# Patient Record
Sex: FEMALE | Race: BLACK OR AFRICAN AMERICAN | NOT HISPANIC OR LATINO | URBAN - METROPOLITAN AREA
[De-identification: names, ages, dates, MRNs, and addresses within clinical notes are randomized per-mention and may not be internally consistent; named-entity substitution may affect disease eponyms.]

---

## 2021-09-04 ENCOUNTER — EMERGENCY (EMERGENCY)
Facility: HOSPITAL | Age: 86
LOS: 1 days | Discharge: HOME | End: 2021-09-04
Attending: EMERGENCY MEDICINE | Admitting: EMERGENCY MEDICINE
Payer: MEDICARE

## 2021-09-04 VITALS
SYSTOLIC BLOOD PRESSURE: 156 MMHG | RESPIRATION RATE: 18 BRPM | HEART RATE: 78 BPM | OXYGEN SATURATION: 99 % | DIASTOLIC BLOOD PRESSURE: 87 MMHG | TEMPERATURE: 98 F

## 2021-09-04 VITALS
SYSTOLIC BLOOD PRESSURE: 130 MMHG | RESPIRATION RATE: 16 BRPM | TEMPERATURE: 98 F | OXYGEN SATURATION: 100 % | HEART RATE: 77 BPM | DIASTOLIC BLOOD PRESSURE: 87 MMHG

## 2021-09-04 DIAGNOSIS — Z96.659 PRESENCE OF UNSPECIFIED ARTIFICIAL KNEE JOINT: Chronic | ICD-10-CM

## 2021-09-04 PROCEDURE — 73564 X-RAY EXAM KNEE 4 OR MORE: CPT | Mod: 26,RT

## 2021-09-04 PROCEDURE — 73110 X-RAY EXAM OF WRIST: CPT | Mod: 26,RT

## 2021-09-04 PROCEDURE — 99285 EMERGENCY DEPT VISIT HI MDM: CPT

## 2021-09-04 PROCEDURE — 70450 CT HEAD/BRAIN W/O DYE: CPT | Mod: 26

## 2021-09-04 RX ORDER — ACETAMINOPHEN 500 MG
650 TABLET ORAL ONCE
Refills: 0 | Status: COMPLETED | OUTPATIENT
Start: 2021-09-04 | End: 2021-09-04

## 2021-09-04 RX ADMIN — Medication 650 MILLIGRAM(S): at 15:09

## 2021-09-04 NOTE — ED ADULT TRIAGE NOTE - CHIEF COMPLAINT QUOTE
Pt AOX4 s/p witnessed fall at home, pt on Eliquis; struck right side of head on corner of furniture; no laceration noted; no LOC; pt c/o Right wrist and knee pain; Hx of HTN, AFib Pt AOX4 s/p witnessed fall at home, pt on Eliquis; struck right side of head on corner of furniture; no laceration noted; no LOC; pt c/o Right wrist and knee pain; Hx of HTN, AFib  Please keep grandson informed: Dillan

## 2021-09-04 NOTE — ED PROVIDER NOTE - PHYSICAL EXAMINATION
GENERAL: NAD, lying in bed comfortably  HEAD:  Atraumatic, normocephalic  EYES: EOMI, PERRLA, conjunctiva and sclera clear  ENT: Moist mucous membranes  NECK: Supple, no JVD  HEART: Regular rate and rhythm, no murmurs, rubs, or gallops  LUNGS: Unlabored respirations.  Clear to auscultation bilaterally, no crackles, wheezing, or rhonchi  ABDOMEN: Soft, nontender, nondistended, +BS  EXTREMITIES: 2+ peripheral pulses bilaterally. No clubbing, cyanosis, or edema. R patellar pain on palpation. Thenar eminence pain on palpation. No snuffbox tenderness  NERVOUS SYSTEM:  A&Ox3, CN II-XII wnl, 5/5 strength b/l UE and LE, sensation intact throughout. Romberg and finger-nose testing wnl  SKIN: No rashes or lesions

## 2021-09-04 NOTE — ED PROVIDER NOTE - PATIENT PORTAL LINK FT
You can access the FollowMyHealth Patient Portal offered by [facility] by registering at the following website: [email or web address]. By joining [employer]’s FollowMyHealth portal, you will also be able to view your health information using other applications (apps) compatible with our system.

## 2021-09-04 NOTE — ED PROVIDER NOTE - OBJECTIVE STATEMENT
86yo F PMHx HTN, A-fib (on Eliquis) who presents after a fall. She reports that she was walking down the stairs when she missed 3-4 steps and fell. She is unsure exactly which part of her body she hit, however thinks she hit behind her R ear, her R wrist, and R knee. She reports no LOC, no prodromal Sx including dizziness, nausea, feeling of warmth, palpitations, CP, SOB prior to the fall. Her son was home with her and heard her fall and called 911. Currently she is endorsing R thenar eminence pain and R patellar pain, but otherwise feels well.

## 2021-09-04 NOTE — ED ADULT NURSE NOTE - OBJECTIVE STATEMENT
Pt presents to rm 14, A&Ox4, ambulatory w/o assistance, pmhx of HTN and afib on eliquis, here for evaluation of witnessed mechanical fall- pt states "I was coming down the stairs and missed the step and hit the right side of my forehead, right wrist and right knee." Pt denies loss of consciousness. Denies chest pain, shortness of breath, palpitations, diaphoresis, headaches, fevers, dizziness, nausea, vomiting, diarrhea, or urinary symptoms at this time. No IV placed at this time as per MD orders, pt taken to XR, awaiting CT. Call bell in reach. Will reassess.

## 2021-09-04 NOTE — ED ADULT NURSE NOTE - CHIEF COMPLAINT QUOTE
Pt AOX4 s/p witnessed fall at home, pt on Eliquis; struck right side of head on corner of furniture; no laceration noted; no LOC; pt c/o Right wrist and knee pain; Hx of HTN, AFib  Please keep grandson informed: Dillan

## 2021-09-04 NOTE — ED PROVIDER NOTE - CLINICAL SUMMARY MEDICAL DECISION MAKING FREE TEXT BOX
86yo F PMHx HTN, A-fib (on Eliquis) presenting after fall, no LOC, no prodromal Sx, currently feels well except for R thenar eminence pain and R knee pain. Will obtain CT head non-con, x-rays of the R wrist and R knee and dispo.

## 2021-09-04 NOTE — ED PROVIDER NOTE - ATTENDING CONTRIBUTION TO CARE
DR. PUCKETT, ATTENDING MD-  I performed a face to face bedside interview with the patient regarding history of present illness, review of symptoms and past medical history. I completed an independent physical exam.  I have discussed the patient's plan of care with the resident.   Documentation as above in the note.    84 y/o female afib on eliquis s/p mech fall with right wrist/knee/head pain.  NCAT, no ecchymoses deformity or tenderness on exam.  Eval for ich, bony injury.  Obtain ct head xr right wrist right knee give acetaminophen likely dc home if neg ED imaging.

## 2021-09-04 NOTE — ED PROVIDER NOTE - NS ED ROS FT
REVIEW OF SYSTEMS:    CONSTITUTIONAL: No weakness, fevers or chills  EYES/ENT: No visual changes;  No vertigo or throat pain   MOUTH: No oral lesion, moist  NECK: No pain or stiffness  RESPIRATORY: No cough, wheezing, hemoptysis; No shortness of breath  CARDIOVASCULAR: No chest pain or palpitations  GASTROINTESTINAL: No abdominal or epigastric pain. No nausea, vomiting, or hematemesis; No diarrhea or constipation. No melena or hematochezia.  GENITOURINARY: No dysuria, frequency or hematuria  NEUROLOGICAL: No numbness or weakness  EXTREMITIES: +R thenar eminence pain, R knee pain  SKIN: No itching, rashes  PSYCH: no confusion or altered mental status

## 2023-10-11 ENCOUNTER — INPATIENT (INPATIENT)
Facility: HOSPITAL | Age: 88
LOS: 1 days | Discharge: HOME | DRG: 310 | End: 2023-10-13
Attending: STUDENT IN AN ORGANIZED HEALTH CARE EDUCATION/TRAINING PROGRAM | Admitting: HOSPITALIST
Payer: MEDICARE

## 2023-10-11 VITALS
DIASTOLIC BLOOD PRESSURE: 80 MMHG | HEART RATE: 86 BPM | WEIGHT: 136.91 LBS | HEIGHT: 64 IN | SYSTOLIC BLOOD PRESSURE: 129 MMHG | RESPIRATION RATE: 19 BRPM | OXYGEN SATURATION: 97 % | TEMPERATURE: 98 F

## 2023-10-11 DIAGNOSIS — R55 SYNCOPE AND COLLAPSE: ICD-10-CM

## 2023-10-11 LAB
ALBUMIN SERPL ELPH-MCNC: 4.4 G/DL — SIGNIFICANT CHANGE UP (ref 3.3–5)
ALP SERPL-CCNC: 54 U/L — SIGNIFICANT CHANGE UP (ref 40–120)
ALT FLD-CCNC: 30 U/L — SIGNIFICANT CHANGE UP (ref 10–45)
ANION GAP SERPL CALC-SCNC: 13 MMOL/L — SIGNIFICANT CHANGE UP (ref 5–17)
APTT BLD: 31.8 SEC — SIGNIFICANT CHANGE UP (ref 24.5–35.6)
AST SERPL-CCNC: 36 U/L — SIGNIFICANT CHANGE UP (ref 10–40)
BASE EXCESS BLDV CALC-SCNC: 6.8 MMOL/L — HIGH (ref -2–3)
BASOPHILS # BLD AUTO: 0.03 K/UL — SIGNIFICANT CHANGE UP (ref 0–0.2)
BASOPHILS NFR BLD AUTO: 0.4 % — SIGNIFICANT CHANGE UP (ref 0–2)
BILIRUB SERPL-MCNC: 0.5 MG/DL — SIGNIFICANT CHANGE UP (ref 0.2–1.2)
BUN SERPL-MCNC: 16 MG/DL — SIGNIFICANT CHANGE UP (ref 7–23)
CA-I SERPL-SCNC: 1.26 MMOL/L — SIGNIFICANT CHANGE UP (ref 1.15–1.33)
CALCIUM SERPL-MCNC: 10.8 MG/DL — HIGH (ref 8.4–10.5)
CHLORIDE BLDV-SCNC: 98 MMOL/L — SIGNIFICANT CHANGE UP (ref 96–108)
CHLORIDE SERPL-SCNC: 98 MMOL/L — SIGNIFICANT CHANGE UP (ref 96–108)
CO2 BLDV-SCNC: 34 MMOL/L — HIGH (ref 22–26)
CO2 SERPL-SCNC: 26 MMOL/L — SIGNIFICANT CHANGE UP (ref 22–31)
CREAT SERPL-MCNC: 0.88 MG/DL — SIGNIFICANT CHANGE UP (ref 0.5–1.3)
EGFR: 64 ML/MIN/1.73M2 — SIGNIFICANT CHANGE UP
EOSINOPHIL # BLD AUTO: 0.03 K/UL — SIGNIFICANT CHANGE UP (ref 0–0.5)
EOSINOPHIL NFR BLD AUTO: 0.4 % — SIGNIFICANT CHANGE UP (ref 0–6)
GAS PNL BLDV: 133 MMOL/L — LOW (ref 136–145)
GAS PNL BLDV: SIGNIFICANT CHANGE UP
GLUCOSE BLDV-MCNC: 131 MG/DL — HIGH (ref 70–99)
GLUCOSE SERPL-MCNC: 126 MG/DL — HIGH (ref 70–99)
HCO3 BLDV-SCNC: 32 MMOL/L — HIGH (ref 22–29)
HCT VFR BLD CALC: 41.5 % — SIGNIFICANT CHANGE UP (ref 34.5–45)
HCT VFR BLDA CALC: 45 % — SIGNIFICANT CHANGE UP (ref 34.5–46.5)
HGB BLD CALC-MCNC: 15 G/DL — SIGNIFICANT CHANGE UP (ref 11.7–16.1)
HGB BLD-MCNC: 14.1 G/DL — SIGNIFICANT CHANGE UP (ref 11.5–15.5)
IMM GRANULOCYTES NFR BLD AUTO: 0.5 % — SIGNIFICANT CHANGE UP (ref 0–0.9)
INR BLD: 1.01 RATIO — SIGNIFICANT CHANGE UP (ref 0.85–1.18)
LACTATE BLDV-MCNC: 2.5 MMOL/L — HIGH (ref 0.5–2)
LYMPHOCYTES # BLD AUTO: 3.18 K/UL — SIGNIFICANT CHANGE UP (ref 1–3.3)
LYMPHOCYTES # BLD AUTO: 38.2 % — SIGNIFICANT CHANGE UP (ref 13–44)
MCHC RBC-ENTMCNC: 31.9 PG — SIGNIFICANT CHANGE UP (ref 27–34)
MCHC RBC-ENTMCNC: 34 GM/DL — SIGNIFICANT CHANGE UP (ref 32–36)
MCV RBC AUTO: 93.9 FL — SIGNIFICANT CHANGE UP (ref 80–100)
MONOCYTES # BLD AUTO: 0.63 K/UL — SIGNIFICANT CHANGE UP (ref 0–0.9)
MONOCYTES NFR BLD AUTO: 7.6 % — SIGNIFICANT CHANGE UP (ref 2–14)
NEUTROPHILS # BLD AUTO: 4.42 K/UL — SIGNIFICANT CHANGE UP (ref 1.8–7.4)
NEUTROPHILS NFR BLD AUTO: 52.9 % — SIGNIFICANT CHANGE UP (ref 43–77)
NRBC # BLD: 0 /100 WBCS — SIGNIFICANT CHANGE UP (ref 0–0)
PCO2 BLDV: 49 MMHG — HIGH (ref 39–42)
PH BLDV: 7.43 — SIGNIFICANT CHANGE UP (ref 7.32–7.43)
PLATELET # BLD AUTO: 198 K/UL — SIGNIFICANT CHANGE UP (ref 150–400)
PO2 BLDV: 26 MMHG — SIGNIFICANT CHANGE UP (ref 25–45)
POTASSIUM BLDV-SCNC: 4.1 MMOL/L — SIGNIFICANT CHANGE UP (ref 3.5–5.1)
POTASSIUM SERPL-MCNC: 3.6 MMOL/L — SIGNIFICANT CHANGE UP (ref 3.5–5.3)
POTASSIUM SERPL-SCNC: 3.6 MMOL/L — SIGNIFICANT CHANGE UP (ref 3.5–5.3)
PROT SERPL-MCNC: 8 G/DL — SIGNIFICANT CHANGE UP (ref 6–8.3)
PROTHROM AB SERPL-ACNC: 11.1 SEC — SIGNIFICANT CHANGE UP (ref 9.5–13)
RBC # BLD: 4.42 M/UL — SIGNIFICANT CHANGE UP (ref 3.8–5.2)
RBC # FLD: 11.9 % — SIGNIFICANT CHANGE UP (ref 10.3–14.5)
SAO2 % BLDV: 36.1 % — LOW (ref 67–88)
SODIUM SERPL-SCNC: 137 MMOL/L — SIGNIFICANT CHANGE UP (ref 135–145)
TROPONIN T, HIGH SENSITIVITY RESULT: 6 NG/L — SIGNIFICANT CHANGE UP (ref 0–51)
WBC # BLD: 8.33 K/UL — SIGNIFICANT CHANGE UP (ref 3.8–10.5)
WBC # FLD AUTO: 8.33 K/UL — SIGNIFICANT CHANGE UP (ref 3.8–10.5)

## 2023-10-11 PROCEDURE — 72125 CT NECK SPINE W/O DYE: CPT | Mod: 26,MA

## 2023-10-11 PROCEDURE — 73090 X-RAY EXAM OF FOREARM: CPT | Mod: 26,LT

## 2023-10-11 PROCEDURE — 99223 1ST HOSP IP/OBS HIGH 75: CPT

## 2023-10-11 PROCEDURE — 71045 X-RAY EXAM CHEST 1 VIEW: CPT | Mod: 26

## 2023-10-11 PROCEDURE — 99285 EMERGENCY DEPT VISIT HI MDM: CPT

## 2023-10-11 PROCEDURE — 70450 CT HEAD/BRAIN W/O DYE: CPT | Mod: 26,MA

## 2023-10-11 PROCEDURE — 72170 X-RAY EXAM OF PELVIS: CPT | Mod: 26

## 2023-10-11 PROCEDURE — 73080 X-RAY EXAM OF ELBOW: CPT | Mod: 26,RT

## 2023-10-11 RX ORDER — ACETAMINOPHEN 500 MG
975 TABLET ORAL ONCE
Refills: 0 | Status: COMPLETED | OUTPATIENT
Start: 2023-10-11 | End: 2023-10-11

## 2023-10-11 RX ORDER — POTASSIUM CHLORIDE 20 MEQ
20 PACKET (EA) ORAL ONCE
Refills: 0 | Status: COMPLETED | OUTPATIENT
Start: 2023-10-11 | End: 2023-10-11

## 2023-10-11 RX ORDER — SODIUM CHLORIDE 9 MG/ML
1000 INJECTION INTRAMUSCULAR; INTRAVENOUS; SUBCUTANEOUS ONCE
Refills: 0 | Status: COMPLETED | OUTPATIENT
Start: 2023-10-11 | End: 2023-10-11

## 2023-10-11 RX ORDER — METOPROLOL TARTRATE 50 MG
25 TABLET ORAL
Refills: 0 | Status: DISCONTINUED | OUTPATIENT
Start: 2023-10-12 | End: 2023-10-13

## 2023-10-11 RX ORDER — METOPROLOL TARTRATE 50 MG
5 TABLET ORAL ONCE
Refills: 0 | Status: COMPLETED | OUTPATIENT
Start: 2023-10-11 | End: 2023-10-11

## 2023-10-11 RX ORDER — ACETAMINOPHEN 500 MG
650 TABLET ORAL EVERY 6 HOURS
Refills: 0 | Status: DISCONTINUED | OUTPATIENT
Start: 2023-10-11 | End: 2023-10-13

## 2023-10-11 RX ORDER — METOPROLOL TARTRATE 50 MG
25 TABLET ORAL ONCE
Refills: 0 | Status: COMPLETED | OUTPATIENT
Start: 2023-10-11 | End: 2023-10-11

## 2023-10-11 RX ORDER — APIXABAN 2.5 MG/1
2.5 TABLET, FILM COATED ORAL
Refills: 0 | Status: DISCONTINUED | OUTPATIENT
Start: 2023-10-11 | End: 2023-10-13

## 2023-10-11 RX ORDER — ATORVASTATIN CALCIUM 80 MG/1
20 TABLET, FILM COATED ORAL AT BEDTIME
Refills: 0 | Status: DISCONTINUED | OUTPATIENT
Start: 2023-10-11 | End: 2023-10-13

## 2023-10-11 RX ORDER — LATANOPROST 0.05 MG/ML
1 SOLUTION/ DROPS OPHTHALMIC; TOPICAL
Refills: 0 | Status: DISCONTINUED | OUTPATIENT
Start: 2023-10-11 | End: 2023-10-13

## 2023-10-11 RX ADMIN — Medication 975 MILLIGRAM(S): at 14:44

## 2023-10-11 RX ADMIN — Medication 25 MILLIGRAM(S): at 23:02

## 2023-10-11 RX ADMIN — SODIUM CHLORIDE 1000 MILLILITER(S): 9 INJECTION INTRAMUSCULAR; INTRAVENOUS; SUBCUTANEOUS at 22:34

## 2023-10-11 RX ADMIN — Medication 5 MILLIGRAM(S): at 23:22

## 2023-10-11 NOTE — H&P ADULT - NSHPLABSRESULTS_GEN_ALL_CORE
Chest radiograph interpreted by me with no acute infiltrate or effusion.    WBC 8.3    normal differential    Hgb 14.1    Platelets of 198K    INR 1.01    Random glucose of 126    Cr 0.88  K+ 3.6    HS troponin 6 at 1900.    Lactate 2.5 at 1900.    CTT head and cervical spine>>negative.    Pelvis and RIGHT forearm with NO fracture.  RIGHT elbow radiograph with no fracture. Chest radiograph interpreted by me with no acute infiltrate or effusion.    Initial EKG with NSR at 83 with repeat EKG with irregular with ventricular rate of 160>>see above--I reviewed with Dr. Lilly of Cardiology--he reviewed the EKG and strips and he reads as aberrancy.    WBC 8.3    normal differential    Hgb 14.1    Platelets of 198K    INR 1.01    Random glucose of 126    Cr 0.88  K+ 3.6    HS troponin 6 at 1900.    Lactate 2.5 at 1900.    CTT head and cervical spine>>negative.    Pelvis and RIGHT forearm with NO fracture.  RIGHT elbow radiograph with no fracture.

## 2023-10-11 NOTE — H&P ADULT - TIME BILLING
Review of prior medical records, evaluation and management and communication with Cardiology as above and coordination of care with provider team.

## 2023-10-11 NOTE — H&P ADULT - HISTORY OF PRESENT ILLNESS
NIGHT HOSPITALIST:    Patient UNKNOWN to me previously, assigned to me at this point via the ER to admit this 89 y/o F--followed by her PCP above--patient's adult son in attendance--patient with a history of PAF apparently for 2 years maintained on apixaban 2.5 mg BID--patient with medication bottles--some are nearly full--with patient's blister pack  bedside (not used yet due to patient's Rx bottles still with Rx)--unclear to regular compliance with apixaban--son reports patient handles her own Rx--patient with a history of essential HTN, hypothyroidism, glaucoma,  NIGHT HOSPITALIST:    Patient UNKNOWN to me previously, assigned to me at this point via the ER to admit this 87 y/o F--followed by her PCP above--patient's adult son in attendance--patient with a history of PAF apparently for 2 years maintained on apixaban 2.5 mg BID--patient with medication bottles--some are nearly full--with patient's blister pack  bedside (not used yet due to patient's Rx bottles still with Rx)--unclear to regular compliance with apixaban--son reports patient handles her own Rx--patient with a history of essential HTN, hypothyroidism, glaucoma,  NIGHT HOSPITALIST:    Patient UNKNOWN to me previously, assigned to me at this point via the ER to admit this 89 y/o F--followed by her PCP above--patient's adult son in attendance--patient with a history of PAF apparently for 2 years maintained on apixaban 2.5 mg BID--patient with medication bottles--some are nearly full--with patient's blister pack  bedside (not used yet due to patient's Rx bottles still with Rx)--unclear to regular compliance with apixaban--son reports patient handles her own Rx--patient with a history of essential HTN, hypothyroidism, glaucoma,   Patient was apparently standing and opening her refrigerator door when patient fell backwards but could not clarify how she fell.  NO clear LOC.  Patient apparently was then brought to the ER.   Patient denies chest pain/pressure.  NO palpitations.  NO fever, no chills, no rigors.  NO cough.  No dyspnoea.   Patient reports she normally ambulates at home but appears to self limit her activity.  NO HA, no focal weakness.  NO abdominal pain, no red blood per rectum or melena.  No back pain, no tearing back pain.   NO dysuria, no hematuria. NIGHT HOSPITALIST:    Patient UNKNOWN to me previously, assigned to me at this point via the ER to admit this 87 y/o F--followed by her PCP above--patient's adult son in attendance--patient with a history of PAF apparently for 2 years maintained on apixaban 2.5 mg BID--patient with medication bottles--some are nearly full--with patient's blister pack  bedside (not used yet due to patient's Rx bottles still with Rx)--unclear to regular compliance with apixaban--son reports patient handles her own Rx--patient with a history of essential HTN, hypothyroidism, glaucoma,   Patient was apparently standing and opening her refrigerator door when patient fell backwards but could not clarify how she fell.  NO clear LOC.  Patient apparently was then brought to the ER.   Patient denies chest pain/pressure.  NO palpitations.  NO fever, no chills, no rigors.  NO cough.  No dyspnoea.   Patient reports she normally ambulates at home but appears to self limit her activity.  NO HA, no focal weakness.  NO abdominal pain, no red blood per rectum or melena.  No back pain, no tearing back pain.   NO dysuria, no hematuria.

## 2023-10-11 NOTE — H&P ADULT - ASSESSMENT
NIGHT HOSPITALIST:    NIGHT HOSPITALIST:     Presentation of apparent syncopal episode with negative CTT head but with paroxysms of atrial fibrillation with rapid ventricular response with EKG and strip I reviewed with Cardiology--Cardiology reviews the EKG as aberrancy--Cardiology to see patient this evening.   I reviewed with patient (who handles her own Rx) and son in attendance and I am not sure if patient is compliant with her apixaban or her Lopressor.    Patient's oral Lopressor of 25 mg x 1 and one IV Lopressor 5 mg given per Cardiology with telemetry now in atrial fibrillation with narrow complex at 130s  Stable BP.    Will continue with patient's Lopressor 25 mg BID for now unless the patient needs further titration.   Patient's apixaban given in the ER.    Will temporarily HOLD the HCTZ and Losartan until review by the Daytime Provider in the AM to avoid risk for hypotension.    Echo ordered.    TSH  sent STAT and will temporarily HOLD the Synthroid pending TSH.    Patient/ son aware of risk /benefits of full AC and agrees to continue as such.    Mechanical soft with patient's poor dentition, edentulous.   Aspiration precautions. NIGHT HOSPITALIST:     Presentation of apparent syncopal episode with negative CTT head but with paroxysms of atrial fibrillation with rapid ventricular response with EKG and strip I reviewed with Cardiology--Cardiology reviews the EKG as aberrancy--Cardiology to see patient this evening.   I reviewed with patient (who handles her own Rx) and son in attendance and I am not sure if patient is compliant--given review of her medication bottles and short term recall issues--with her apixaban or her Lopressor.    Patient's oral Lopressor of 25 mg x 1 and one IV Lopressor 5 mg given per Cardiology with telemetry now in atrial fibrillation with narrow complex at 130s  Stable BP.    Will continue with patient's Lopressor 25 mg BID for now unless the patient needs further titration.   Patient's apixaban given in the ER.    Will temporarily HOLD the HCTZ and Losartan until review by the Daytime Provider in the AM to avoid risk for hypotension.    Echo ordered.    TSH  sent STAT and will temporarily HOLD the Synthroid pending TSH.    Patient/ son aware of risk /benefits of full AC and agrees to continue as such.    Mechanical soft with patient's poor dentition, edentulous.   Aspiration precautions.

## 2023-10-11 NOTE — H&P ADULT - NSHPOUTPATIENTPROVIDERS_GEN_ALL_CORE
Jaycee Martines MD (PCP) [facility]  Jaycee Martines MD (PCP) [facility]  Jaycee Martines MD (PCP) [facility]

## 2023-10-11 NOTE — ED ADULT TRIAGE NOTE - CHIEF COMPLAINT QUOTE
pt c/o head injury s/p fall "while reaching for something in the refrigerator"   pt reports h/o afib on eliquis  pt denies cp, dizziness/lightheadedness, weakness

## 2023-10-11 NOTE — ED PROVIDER NOTE - ATTENDING CONTRIBUTION TO CARE
see mdm    edited by Lisa Golden DO - attending physician.   Please see progress notes for status/labs/consult updates and ED course after initial presentation.

## 2023-10-11 NOTE — H&P ADULT - NSICDXPASTMEDICALHX_GEN_ALL_CORE_FT
PAST MEDICAL HISTORY:  Essential hypertension     Glaucoma     Hypothyroidism     PAF (paroxysmal atrial fibrillation)

## 2023-10-11 NOTE — H&P ADULT - NSHPREVIEWOFSYSTEMS_GEN_ALL_CORE
NO HA, no focal weakness.  NO chest pain/pressure.  No palpitations.  NO breast symptoms.  NO abdominal pain, no red blood per rectum or melena.  NO back pain, no tearing back pain.    NO SI/HI>  NO vaginal bleeding.  NO rash.  NO dysuria, no hematuria.  NO thyroid symptoms.    Patient reports COVID-19 vaccine x 2.

## 2023-10-11 NOTE — H&P ADULT - NSHPSOURCEINFOTX_GEN_ALL_CORE
Adult son in attendance with patient's permission.  No office notes available.  Bottle Rx and Blister Rx packs reviewed with patient bedside

## 2023-10-11 NOTE — H&P ADULT - NSHPADDITIONALINFOADULT_GEN_ALL_CORE
NIGHT HOSPITALIST:    Patient/ son in attendance aware of course and agree with plan/care as above.   Given patient's comorbidities, patient's long term prognosis is guarded.   Emotional support provided to patient/ son in attendance.   The patient and family are not yet ready to discuss advance directives.   Care reviewed with covering NP/PA for endorsement to my physician colleagues in the AM.    Gerry Castro MD  Available on [employer] Teams.

## 2023-10-11 NOTE — ED ADULT TRIAGE NOTE - GLASGOW COMA SCALE: EYE OPENING, MLM
You can access the FollowMyHealth Patient Portal offered by [facility] by registering at the following website: [email or web address]. By joining [employer]s FollowMyHealth portal, you will also be able to view your health information using other applications (apps) compatible with our system.
(E4) spontaneous

## 2023-10-11 NOTE — ED ADULT NURSE NOTE - OBJECTIVE STATEMENT
88y female with hx of afib on eliquis presents to the ER s/p fall. Patient reports that she was trying to reach for something out of her refrigerator when she fell backwards and hit the back of her head on the ground.  Patient reports that she was helped to her feet by her son and aide.  Denies LOC.  No preceding dizziness/lightheadedness, chest pain, shortness of breath, palpitations.  Patient currently complaining of occipital headache and right elbow pain. 88y female with hx of afib on eliquis presents to the ER s/p fall. Patient reports that she was trying to reach for something out of her refrigerator when she fell backwards and hit the back of her head on the ground.  Patient reports that she was helped to her feet by her son and aide.  Denies LOC.  No preceding dizziness/lightheadedness, chest pain, shortness of breath, palpitations. Pt given Tylenol by Qrn, pt no longer c/o pain in main ED. PT vss. Pending radiology results.

## 2023-10-11 NOTE — ED PROVIDER NOTE - CLINICAL SUMMARY MEDICAL DECISION MAKING FREE TEXT BOX
88-year-old female with unclear etiology of fall concerning for drop syncope.  Differential diagnosis includes but not limited to: Electrolyte derangements, anemia, ACS.  EKG normal sinus rhythm at a rate of 83 bpm however on exam patient sounded irregularly irregular suggestive of paroxysmal A-fib.  Patient is on anticoagulation.CTs and x-rays were reviewed and interpreted by myself without acute findings/injuries.  Will perform lab testing, placed on cardiac monitor and consider admission for further monitoring.

## 2023-10-11 NOTE — ED PROVIDER NOTE - PHYSICAL EXAMINATION
PE:  Gen: NAD  Head: NCAT  ENT: MMM, Normal conjunctiva, EOMI, no nystagmus  Chest: RRR, normal perfusion  Lungs: Symmetrical chest rise, lungs CTAB  Abdomen: soft, NTND, No rebound/guarding  Ext: No gross deformities  Skin: no rashes   Neurologic Exam:   Patient A&O to person, place, time, and situation.   GCS 15 (E4M5V6)  Cranial Nerves II-XII intact & symmetric.  Speech is normal and fluent.  Motor 5/5 and symmetric in both upper & lower extremities with normal tone and no tremor.  Sensation intact in both upper and lower extremities.  Gait normal  Normal finger to nose, no dysdiadochokinesia PE:  Gen: NAD  Head: NCAT  ENT: MMM, Normal conjunctiva, EOMI, no nystagmus  Chest: irregularly irregular rhythm, normal perfusion  Lungs: Symmetrical chest rise, lungs CTAB  Abdomen: soft, NTND, No rebound/guarding  Ext: No gross deformities  Skin: no rashes   Neurologic Exam:   Patient A&O to person, place, time, and situation.   GCS 15 (E4M5V6)  Cranial Nerves II-XII intact & symmetric.  Speech is normal and fluent.  Motor 5/5 and symmetric in both upper & lower extremities with normal tone and no tremor.  Sensation intact in both upper and lower extremities.  Gait normal  Normal finger to nose, no dysdiadochokinesia

## 2023-10-11 NOTE — H&P ADULT - PROBLEM SELECTOR PLAN 5
Transitions of Care Status:  1.  Name of PCP:    Jaycee Martines MD (PCP) [facility]   2.  PCP Contacted on Admission: [ ] Y    [x ] N    3.  PCP contacted at Discharge: [ ] Y    [ ] N    [ ] N/A  4.  Post-Discharge Appointment Date and Location:  5.  Summary of Handoff given to PCP: Transitions of Care Status:  1.  Name of PCP:    Jaycee Martines MD (PCP) [facility] ([place], [place])   2.  PCP Contacted on Admission: [ ] Y    [x ] N    3.  PCP contacted at Discharge: [ ] Y    [ ] N    [ ] N/A  4.  Post-Discharge Appointment Date and Location:  5.  Summary of Handoff given to PCP: Transitions of Care Status:  1.  Name of PCP:    Jaycee Martines MD (PCP) [facility]   2.  PCP Contacted on Admission: [ ] Y    [x ] N    3.  PCP contacted at Discharge: [ ] Y    [ ] N    [ ] N/A  4.  Post-Discharge Appointment Date and Location:  5.  Summary of Handoff given to PCP:

## 2023-10-11 NOTE — H&P ADULT - PROBLEM SELECTOR PLAN 1
See above.  S/P dose of Lopressor 25 mg PO and Lopressor 5 mg IV x 1.  Now narrow complex atrial fibrillation at 130s and reviewed with Cardiology who will see patient tonight.    Patient's apixaban given in the ER.

## 2023-10-11 NOTE — ED PROVIDER NOTE - OBJECTIVE STATEMENT
88-year-old female with history of hypertension, A-fib on Eliquis here for fall.  On initial interview, patient reports that she fell backwards and hit her head on the ground however she does not know how she fell.  No palpitations, shortness of breath, nausea vomiting, diaphoresis, chest pain prior to fall.  No dizziness.  No difficulty walking.  Patient does not remember the act of falling just remembers being at the refrigerator and then ending up on the ground.

## 2023-10-11 NOTE — ED PROVIDER NOTE - NS ED MD DISPO DIVISION
[facility]

## 2023-10-11 NOTE — ED ADULT NURSE REASSESSMENT NOTE - NS ED NURSE REASSESS COMMENT FT1
pt placed on pads due to arrythmia as per MD biggs. nocturnstephania albrecht  also at bedside sates to give lopressor IV 5 mg

## 2023-10-11 NOTE — ED CLERICAL - DIVISION
[facility]... [facility]... [facility]...

## 2023-10-11 NOTE — H&P ADULT - MENTAL STATUS
AxOx3.  Speech fluent.  Cognition grossly intact, albeit mild short term cognitive impairment.  No need for prompting, interactive with examiner and son in attendance.

## 2023-10-11 NOTE — H&P ADULT - NSHPPHYSICALEXAM_GEN_ALL_CORE
Afebrile.    HR  initially 78 but with episode of irregular rate to 160s with groups of wide complex>>I reviewed with Dr. OSVALDO Lilly of cardiology who reviewed the EKG and strip--suspects atrial fibrillation with aberrancy.      /78    100% on RA

## 2023-10-11 NOTE — ED PROVIDER NOTE - RAPID ASSESSMENT
88-year-old female with past medical history of HTN, A-fib on Eliquis presenting with fall.  Patient reports that she was trying to reach for something out of her refrigerator when she fell backwards and hit the back of her head on the ground.  Patient reports that she was helped to her feet by her son and aide.  Denies LOC.  No preceding dizziness/lightheadedness, chest pain, shortness of breath, palpitations.  Patient currently complaining of occipital headache and right elbow pain.  Patient is otherwise been ambulatory since the fall.  Denies chest pain, shortness of breath, abdominal pain, vomiting, diarrhea, dysuria.    **Patient was rapidly assessed by me, Louis Mckeon PA-C. A limited history was obtained. The patient will be seen and further examined/worked up in the main ED and their care will be completed by the main ED team. Receiving team will follow up on labs, analgesia, any clinical imaging, and perform reassessment and disposition of the patient as clinically indicated. All decisions regarding the progression of care will be made at their discretion. 88-year-old female with past medical history of HTN, A-fib on Eliquis presenting with fall.  Patient reports that she was trying to reach for something out of her refrigerator when she fell backwards and hit the back of her head on the ground.  Patient reports that she was helped to her feet by her son and aide.  Denies LOC.  No preceding dizziness/lightheadedness, chest pain, shortness of breath, palpitations.  Patient currently complaining of occipital headache and right elbow pain.  Patient is otherwise been ambulatory since the fall.  Denies chest pain, shortness of breath, abdominal pain, vomiting, diarrhea, dysuria.    **Patient was rapidly assessed by me, Louis Mckeon PA-C. A limited history was obtained. The patient will be seen and further examined/worked up in the main ED and their care will be completed by the main ED team. Receiving team will follow up on labs, analgesia, any clinical imaging, and perform reassessment and disposition of the patient as clinically indicated. All decisions regarding the progression of care will be made at their discretion.    Attending MD Woodruff: This patient was seen and orders were placed by the PA as per our department's QPA model.  I was not consulted in regards to this patient although I was present and available in the Emergency Department to the PA.  Patient was to be sent to main ED for full medical evaluation and receiving team was to follow up on any labs, analgesia, clinical imaging ordered by the PA.  Any reassessment and disposition decisions were to be made by receiving team as clinically indicated, all decisions regarding the progression of care to be made at their discretion.  I did not perform a comprehensive history and physical on this patient.

## 2023-10-11 NOTE — ED PROVIDER NOTE - PROGRESS NOTE DETAILS
Eladia Edwards MD, PGY2: pt signed out to me pending admission for tele monitoring Eladia Edwards MD, PGY2: pt signed out to me pending admission for tele monitoring. pt consents to admission Lisa Golden, Attending Physician: Patient became acutely tachycardic to the 170s.  EKG was performed at a rate of 160.  She is bouncing between the 120s and 140s.  Will order fluids.  Is unclear etiology but a tachyarrhythmia was a concern as a possible cause of syncopal episode warranting admission on telemetry.  Endorsed to hospitalist. Will call EP. Eladia Edwards MD, PGY2: discussed with cardiology, will come see pt. hospitalist at bedside

## 2023-10-12 DIAGNOSIS — E03.9 HYPOTHYROIDISM, UNSPECIFIED: ICD-10-CM

## 2023-10-12 DIAGNOSIS — Z02.9 ENCOUNTER FOR ADMINISTRATIVE EXAMINATIONS, UNSPECIFIED: ICD-10-CM

## 2023-10-12 DIAGNOSIS — I48.0 PAROXYSMAL ATRIAL FIBRILLATION: ICD-10-CM

## 2023-10-12 DIAGNOSIS — I10 ESSENTIAL (PRIMARY) HYPERTENSION: ICD-10-CM

## 2023-10-12 DIAGNOSIS — K08.9 DISORDER OF TEETH AND SUPPORTING STRUCTURES, UNSPECIFIED: ICD-10-CM
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
## 2023-10-12 LAB
A1C WITH ESTIMATED AVERAGE GLUCOSE RESULT: 6.3 % — HIGH (ref 4–5.6)
ANION GAP SERPL CALC-SCNC: 12 MMOL/L — SIGNIFICANT CHANGE UP (ref 5–17)
BASOPHILS # BLD AUTO: 0.02 K/UL — SIGNIFICANT CHANGE UP (ref 0–0.2)
BASOPHILS NFR BLD AUTO: 0.3 % — SIGNIFICANT CHANGE UP (ref 0–2)
BUN SERPL-MCNC: 19 MG/DL — SIGNIFICANT CHANGE UP (ref 7–23)
CALCIUM SERPL-MCNC: 9.4 MG/DL — SIGNIFICANT CHANGE UP (ref 8.4–10.5)
CHLORIDE SERPL-SCNC: 105 MMOL/L — SIGNIFICANT CHANGE UP (ref 96–108)
CO2 SERPL-SCNC: 24 MMOL/L — SIGNIFICANT CHANGE UP (ref 22–31)
CREAT SERPL-MCNC: 0.92 MG/DL — SIGNIFICANT CHANGE UP (ref 0.5–1.3)
EGFR: 61 ML/MIN/1.73M2 — SIGNIFICANT CHANGE UP
EOSINOPHIL # BLD AUTO: 0.07 K/UL — SIGNIFICANT CHANGE UP (ref 0–0.5)
EOSINOPHIL NFR BLD AUTO: 1.1 % — SIGNIFICANT CHANGE UP (ref 0–6)
ESTIMATED AVERAGE GLUCOSE: 134 MG/DL — HIGH (ref 68–114)
GLUCOSE SERPL-MCNC: 123 MG/DL — HIGH (ref 70–99)
HCT VFR BLD CALC: 40.6 % — SIGNIFICANT CHANGE UP (ref 34.5–45)
HGB BLD-MCNC: 13.5 G/DL — SIGNIFICANT CHANGE UP (ref 11.5–15.5)
IMM GRANULOCYTES NFR BLD AUTO: 0.5 % — SIGNIFICANT CHANGE UP (ref 0–0.9)
LYMPHOCYTES # BLD AUTO: 2.31 K/UL — SIGNIFICANT CHANGE UP (ref 1–3.3)
LYMPHOCYTES # BLD AUTO: 36.8 % — SIGNIFICANT CHANGE UP (ref 13–44)
MCHC RBC-ENTMCNC: 31.9 PG — SIGNIFICANT CHANGE UP (ref 27–34)
MCHC RBC-ENTMCNC: 33.3 GM/DL — SIGNIFICANT CHANGE UP (ref 32–36)
MCV RBC AUTO: 96 FL — SIGNIFICANT CHANGE UP (ref 80–100)
MONOCYTES # BLD AUTO: 0.72 K/UL — SIGNIFICANT CHANGE UP (ref 0–0.9)
MONOCYTES NFR BLD AUTO: 11.5 % — SIGNIFICANT CHANGE UP (ref 2–14)
NEUTROPHILS # BLD AUTO: 3.12 K/UL — SIGNIFICANT CHANGE UP (ref 1.8–7.4)
NEUTROPHILS NFR BLD AUTO: 49.8 % — SIGNIFICANT CHANGE UP (ref 43–77)
NRBC # BLD: 0 /100 WBCS — SIGNIFICANT CHANGE UP (ref 0–0)
PLATELET # BLD AUTO: 222 K/UL — SIGNIFICANT CHANGE UP (ref 150–400)
POTASSIUM SERPL-MCNC: 3.8 MMOL/L — SIGNIFICANT CHANGE UP (ref 3.5–5.3)
POTASSIUM SERPL-SCNC: 3.8 MMOL/L — SIGNIFICANT CHANGE UP (ref 3.5–5.3)
RBC # BLD: 4.23 M/UL — SIGNIFICANT CHANGE UP (ref 3.8–5.2)
RBC # FLD: 11.9 % — SIGNIFICANT CHANGE UP (ref 10.3–14.5)
SODIUM SERPL-SCNC: 141 MMOL/L — SIGNIFICANT CHANGE UP (ref 135–145)
T4 FREE SERPL-MCNC: 0.9 NG/DL — SIGNIFICANT CHANGE UP (ref 0.9–1.8)
TROPONIN T, HIGH SENSITIVITY RESULT: 12 NG/L — SIGNIFICANT CHANGE UP (ref 0–51)
TSH SERPL-MCNC: 8 UIU/ML — HIGH (ref 0.27–4.2)
WBC # BLD: 6.27 K/UL — SIGNIFICANT CHANGE UP (ref 3.8–10.5)
WBC # FLD AUTO: 6.27 K/UL — SIGNIFICANT CHANGE UP (ref 3.8–10.5)

## 2023-10-12 PROCEDURE — 93306 TTE W/DOPPLER COMPLETE: CPT | Mod: 26

## 2023-10-12 PROCEDURE — 99222 1ST HOSP IP/OBS MODERATE 55: CPT

## 2023-10-12 PROCEDURE — 93356 MYOCRD STRAIN IMG SPCKL TRCK: CPT

## 2023-10-12 PROCEDURE — 99232 SBSQ HOSP IP/OBS MODERATE 35: CPT

## 2023-10-12 RX ORDER — LEVOTHYROXINE SODIUM 125 MCG
50 TABLET ORAL DAILY
Refills: 0 | Status: DISCONTINUED | OUTPATIENT
Start: 2023-10-12 | End: 2023-10-13

## 2023-10-12 RX ADMIN — LATANOPROST 1 DROP(S): 0.05 SOLUTION/ DROPS OPHTHALMIC; TOPICAL at 23:59

## 2023-10-12 RX ADMIN — APIXABAN 2.5 MILLIGRAM(S): 2.5 TABLET, FILM COATED ORAL at 05:41

## 2023-10-12 RX ADMIN — APIXABAN 2.5 MILLIGRAM(S): 2.5 TABLET, FILM COATED ORAL at 00:01

## 2023-10-12 RX ADMIN — ATORVASTATIN CALCIUM 20 MILLIGRAM(S): 80 TABLET, FILM COATED ORAL at 21:57

## 2023-10-12 RX ADMIN — ATORVASTATIN CALCIUM 20 MILLIGRAM(S): 80 TABLET, FILM COATED ORAL at 00:01

## 2023-10-12 RX ADMIN — Medication 20 MILLIEQUIVALENT(S): at 00:01

## 2023-10-12 RX ADMIN — Medication 25 MILLIGRAM(S): at 17:40

## 2023-10-12 RX ADMIN — APIXABAN 2.5 MILLIGRAM(S): 2.5 TABLET, FILM COATED ORAL at 17:37

## 2023-10-12 RX ADMIN — Medication 25 MILLIGRAM(S): at 05:41

## 2023-10-12 NOTE — PHYSICAL THERAPY INITIAL EVALUATION ADULT - GENERAL OBSERVATIONS, REHAB EVAL
received semisupine in stretcher, A&OX4, following commands, pleasant & eager to participate, admitted s/p fall, AFIB with RVR

## 2023-10-12 NOTE — CONSULT NOTE ADULT - SUBJECTIVE AND OBJECTIVE BOX
Amado Lilly MD  Cardiology Fellow  All Cardiology service information can be found 24/7 on [email or web address], password: [email or web address]    Patient seen and evaluated at bedside    Chief Complaint:    HPI:  NIGHT HOSPITALIST:    Patient UNKNOWN to me previously, assigned to me at this point via the ER to admit this 89 y/o F--followed by her PCP above--patient's adult son in attendance--patient with a history of PAF apparently for 2 years maintained on apixaban 2.5 mg BID--patient with medication bottles--some are nearly full--with patient's blister pack  bedside (not used yet due to patient's Rx bottles still with Rx)--unclear to regular compliance with apixaban--son reports patient handles her own Rx--patient with a history of essential HTN, hypothyroidism, glaucoma,   Patient was apparently standing and opening her refrigerator door when patient fell backwards but could not clarify how she fell.  NO clear LOC.  Patient apparently was then brought to the ER.   Patient denies chest pain/pressure.  NO palpitations.  NO fever, no chills, no rigors.  NO cough.  No dyspnoea.   Patient reports she normally ambulates at home but appears to self limit her activity.  NO HA, no focal weakness.  NO abdominal pain, no red blood per rectum or melena.  No back pain, no tearing back pain.   NO dysuria, no hematuria. (11 Oct 2023 22:52)    89 yo F with PMHx paroxysmal a-fib on Eliquis, HTN, and hypothyroidism who presents       PMHx:   PAF (paroxysmal atrial fibrillation)    Essential hypertension    Glaucoma    Hypothyroidism        PSHx:   No significant past surgical history        Allergies:  No Known Allergies      Home Medications:  Eliquis 2.5 mg oral tablet: 1 tab(s) orally 2 times a day (11 Oct 2023 22:55)  hydroCHLOROthiazide 12.5 mg oral tablet: 1 tab(s) orally once a day (11 Oct 2023 22:55)  latanoprost 0.005% ophthalmic solution: 1 drop(s) in each eye once a day (at bedtime) BOTH EYES (11 Oct 2023 22:56)  Lipitor 20 mg oral tablet: 1 tab(s) orally once a day (at bedtime) (11 Oct 2023 22:56)  losartan 25 mg oral tablet: 1 tab(s) orally once a day (11 Oct 2023 22:56)  metoprolol tartrate 25 mg oral tablet: 1 tab(s) orally 2 times a day (11 Oct 2023 22:58)  Synthroid 50 mcg (0.05 mg) oral tablet: 1 tab(s) orally once a day (11 Oct 2023 22:57)      Current Medications:   acetaminophen     Tablet .. 650 milliGRAM(s) Oral every 6 hours PRN  apixaban 2.5 milliGRAM(s) Oral two times a day  atorvastatin 20 milliGRAM(s) Oral at bedtime  latanoprost 0.005% Ophthalmic Solution 1 Drop(s) Both EYES <User Schedule>      FAMILY HISTORY:  No pertinent family history in first degree relatives        Social History:  Smoking History:  Alcohol Use:  Drug Use:    REVIEW OF SYSTEMS:  CONSTITUTIONAL: No weakness, fevers or chills  EYES/ENT: No visual changes;  No dysphagia  NECK: No pain or stiffness  RESPIRATORY: No cough, wheezing, hemoptysis; No shortness of breath  CARDIOVASCULAR: No chest pain or palpitations; No lower extremity edema  GASTROINTESTINAL: No abdominal or epigastric pain. No nausea, vomiting, or hematemesis; No diarrhea or constipation. No melena or hematochezia.  BACK: No back pain  GENITOURINARY: No dysuria, frequency or hematuria  NEUROLOGICAL: No numbness or weakness  SKIN: No itching, burning, rashes, or lesions   All other review of systems is negative unless indicated above.    Physical Exam:  T(F): 98.3 (10-12), Max: 98.3 (10-12)  HR: 81 (10-12) (78 - 145)  BP: 112/77 (10-12) (102/68 - 144/78)  RR: 18 (10-12)  SpO2: 97% (10-12)  GENERAL: No acute distress, well-developed  HEAD:  Atraumatic, Normocephalic  ENT: EOMI, PERRLA, conjunctiva and sclera clear, Neck supple, No JVD, moist mucosa  CHEST/LUNG: Clear to auscultation bilaterally; No wheeze, equal breath sounds bilaterally   BACK: No spinal tenderness  HEART: Regular rate and rhythm; No murmurs, rubs, or gallops  ABDOMEN: Soft, Nontender, Nondistended; Bowel sounds present  EXTREMITIES:  No clubbing, cyanosis, or edema  PSYCH: Nl behavior, nl affect  NEUROLOGY: AAOx3, non-focal, cranial nerves intact  SKIN: Normal color, No rashes or lesions  LINES:    Cardiovascular Diagnostic Testing:    ECG: Personally reviewed:    Echo: Personally reviewed:    Stress Testing:    Cath:    Imaging:    CXR: Personally reviewed    Labs: Personally reviewed                        14.1   8.33  )-----------( 198      ( 11 Oct 2023 19:00 )             41.5     10-11    137  |  98  |  16  ----------------------------<  126<H>  3.6   |  26  |  0.88    Ca    10.8<H>      11 Oct 2023 19:00  Mg     1.8     10-12    TPro  8.0  /  Alb  4.4  /  TBili  0.5  /  DBili  x   /  AST  36  /  ALT  30  /  AlkPhos  54  10-11    PT/INR - ( 11 Oct 2023 19:00 )   PT: 11.1 sec;   INR: 1.01 ratio         PTT - ( 11 Oct 2023 19:00 )  PTT:31.8 sec    CARDIAC MARKERS ( 12 Oct 2023 00:13 )  12 ng/L / x     / x     / x     / x     / x      CARDIAC MARKERS ( 11 Oct 2023 19:00 )  6 ng/L / x     / x     / x     / x     / x                     Amado Lilly MD  Cardiology Fellow  All Cardiology service information can be found 24/7 on [email or web address], password: [email or web address]    Patient seen and evaluated at bedside    Chief Complaint:    HPI:  NIGHT HOSPITALIST:    Patient UNKNOWN to me previously, assigned to me at this point via the ER to admit this 89 y/o F--followed by her PCP above--patient's adult son in attendance--patient with a history of PAF apparently for 2 years maintained on apixaban 2.5 mg BID--patient with medication bottles--some are nearly full--with patient's blister pack  bedside (not used yet due to patient's Rx bottles still with Rx)--unclear to regular compliance with apixaban--son reports patient handles her own Rx--patient with a history of essential HTN, hypothyroidism, glaucoma,   Patient was apparently standing and opening her refrigerator door when patient fell backwards but could not clarify how she fell.  NO clear LOC.  Patient apparently was then brought to the ER.   Patient denies chest pain/pressure.  NO palpitations.  NO fever, no chills, no rigors.  NO cough.  No dyspnoea.   Patient reports she normally ambulates at home but appears to self limit her activity.  NO HA, no focal weakness.  NO abdominal pain, no red blood per rectum or melena.  No back pain, no tearing back pain.   NO dysuria, no hematuria. (11 Oct 2023 22:52)    87 yo F with PMHx paroxysmal a-fib on Eliquis, HTN, and hypothyroidism who presents       PMHx:   PAF (paroxysmal atrial fibrillation)    Essential hypertension    Glaucoma    Hypothyroidism        PSHx:   No significant past surgical history        Allergies:  No Known Allergies      Home Medications:  Eliquis 2.5 mg oral tablet: 1 tab(s) orally 2 times a day (11 Oct 2023 22:55)  hydroCHLOROthiazide 12.5 mg oral tablet: 1 tab(s) orally once a day (11 Oct 2023 22:55)  latanoprost 0.005% ophthalmic solution: 1 drop(s) in each eye once a day (at bedtime) BOTH EYES (11 Oct 2023 22:56)  Lipitor 20 mg oral tablet: 1 tab(s) orally once a day (at bedtime) (11 Oct 2023 22:56)  losartan 25 mg oral tablet: 1 tab(s) orally once a day (11 Oct 2023 22:56)  metoprolol tartrate 25 mg oral tablet: 1 tab(s) orally 2 times a day (11 Oct 2023 22:58)  Synthroid 50 mcg (0.05 mg) oral tablet: 1 tab(s) orally once a day (11 Oct 2023 22:57)      Current Medications:   acetaminophen     Tablet .. 650 milliGRAM(s) Oral every 6 hours PRN  apixaban 2.5 milliGRAM(s) Oral two times a day  atorvastatin 20 milliGRAM(s) Oral at bedtime  latanoprost 0.005% Ophthalmic Solution 1 Drop(s) Both EYES <User Schedule>      FAMILY HISTORY:  No pertinent family history in first degree relatives        Social History:  Smoking History:  Alcohol Use:  Drug Use:    REVIEW OF SYSTEMS:  CONSTITUTIONAL: No weakness, fevers or chills  EYES/ENT: No visual changes;  No dysphagia  NECK: No pain or stiffness  RESPIRATORY: No cough, wheezing, hemoptysis; No shortness of breath  CARDIOVASCULAR: No chest pain or palpitations; No lower extremity edema  GASTROINTESTINAL: No abdominal or epigastric pain. No nausea, vomiting, or hematemesis; No diarrhea or constipation. No melena or hematochezia.  BACK: No back pain  GENITOURINARY: No dysuria, frequency or hematuria  NEUROLOGICAL: No numbness or weakness  SKIN: No itching, burning, rashes, or lesions   All other review of systems is negative unless indicated above.    Physical Exam:  T(F): 98.3 (10-12), Max: 98.3 (10-12)  HR: 81 (10-12) (78 - 145)  BP: 112/77 (10-12) (102/68 - 144/78)  RR: 18 (10-12)  SpO2: 97% (10-12)  GENERAL: No acute distress, well-developed  HEAD:  Atraumatic, Normocephalic  ENT: EOMI, PERRLA, conjunctiva and sclera clear, Neck supple, No JVD, moist mucosa  CHEST/LUNG: Clear to auscultation bilaterally; No wheeze, equal breath sounds bilaterally   BACK: No spinal tenderness  HEART: Regular rate and rhythm; No murmurs, rubs, or gallops  ABDOMEN: Soft, Nontender, Nondistended; Bowel sounds present  EXTREMITIES:  No clubbing, cyanosis, or edema  PSYCH: Nl behavior, nl affect  NEUROLOGY: AAOx3, non-focal, cranial nerves intact  SKIN: Normal color, No rashes or lesions  LINES:    Cardiovascular Diagnostic Testing:    ECG: Personally reviewed:    Echo: Personally reviewed:    Stress Testing:    Cath:    Imaging:    CXR: Personally reviewed    Labs: Personally reviewed                        14.1   8.33  )-----------( 198      ( 11 Oct 2023 19:00 )             41.5     10-11    137  |  98  |  16  ----------------------------<  126<H>  3.6   |  26  |  0.88    Ca    10.8<H>      11 Oct 2023 19:00  Mg     1.8     10-12    TPro  8.0  /  Alb  4.4  /  TBili  0.5  /  DBili  x   /  AST  36  /  ALT  30  /  AlkPhos  54  10-11    PT/INR - ( 11 Oct 2023 19:00 )   PT: 11.1 sec;   INR: 1.01 ratio         PTT - ( 11 Oct 2023 19:00 )  PTT:31.8 sec    CARDIAC MARKERS ( 12 Oct 2023 00:13 )  12 ng/L / x     / x     / x     / x     / x      CARDIAC MARKERS ( 11 Oct 2023 19:00 )  6 ng/L / x     / x     / x     / x     / x                     Amado Lilly MD  Cardiology Fellow  All Cardiology service information can be found 24/7 on [email or web address], password: [email or web address]    Patient seen and evaluated at bedside    Chief Complaint:    HPI:  NIGHT HOSPITALIST:    Patient UNKNOWN to me previously, assigned to me at this point via the ER to admit this 87 y/o F--followed by her PCP above--patient's adult son in attendance--patient with a history of PAF apparently for 2 years maintained on apixaban 2.5 mg BID--patient with medication bottles--some are nearly full--with patient's blister pack  bedside (not used yet due to patient's Rx bottles still with Rx)--unclear to regular compliance with apixaban--son reports patient handles her own Rx--patient with a history of essential HTN, hypothyroidism, glaucoma,   Patient was apparently standing and opening her refrigerator door when patient fell backwards but could not clarify how she fell.  NO clear LOC.  Patient apparently was then brought to the ER.   Patient denies chest pain/pressure.  NO palpitations.  NO fever, no chills, no rigors.  NO cough.  No dyspnoea.   Patient reports she normally ambulates at home but appears to self limit her activity.  NO HA, no focal weakness.  NO abdominal pain, no red blood per rectum or melena.  No back pain, no tearing back pain.   NO dysuria, no hematuria. (11 Oct 2023 22:52)    89 yo F with PMHx paroxysmal a-fib on Eliquis, HTN, and hypothyroidism who presents       PMHx:   PAF (paroxysmal atrial fibrillation)    Essential hypertension    Glaucoma    Hypothyroidism        PSHx:   No significant past surgical history        Allergies:  No Known Allergies      Home Medications:  Eliquis 2.5 mg oral tablet: 1 tab(s) orally 2 times a day (11 Oct 2023 22:55)  hydroCHLOROthiazide 12.5 mg oral tablet: 1 tab(s) orally once a day (11 Oct 2023 22:55)  latanoprost 0.005% ophthalmic solution: 1 drop(s) in each eye once a day (at bedtime) BOTH EYES (11 Oct 2023 22:56)  Lipitor 20 mg oral tablet: 1 tab(s) orally once a day (at bedtime) (11 Oct 2023 22:56)  losartan 25 mg oral tablet: 1 tab(s) orally once a day (11 Oct 2023 22:56)  metoprolol tartrate 25 mg oral tablet: 1 tab(s) orally 2 times a day (11 Oct 2023 22:58)  Synthroid 50 mcg (0.05 mg) oral tablet: 1 tab(s) orally once a day (11 Oct 2023 22:57)      Current Medications:   acetaminophen     Tablet .. 650 milliGRAM(s) Oral every 6 hours PRN  apixaban 2.5 milliGRAM(s) Oral two times a day  atorvastatin 20 milliGRAM(s) Oral at bedtime  latanoprost 0.005% Ophthalmic Solution 1 Drop(s) Both EYES <User Schedule>      FAMILY HISTORY:  No pertinent family history in first degree relatives        Social History:  Smoking History:  Alcohol Use:  Drug Use:    REVIEW OF SYSTEMS:  CONSTITUTIONAL: No weakness, fevers or chills  EYES/ENT: No visual changes;  No dysphagia  NECK: No pain or stiffness  RESPIRATORY: No cough, wheezing, hemoptysis; No shortness of breath  CARDIOVASCULAR: No chest pain or palpitations; No lower extremity edema  GASTROINTESTINAL: No abdominal or epigastric pain. No nausea, vomiting, or hematemesis; No diarrhea or constipation. No melena or hematochezia.  BACK: No back pain  GENITOURINARY: No dysuria, frequency or hematuria  NEUROLOGICAL: No numbness or weakness  SKIN: No itching, burning, rashes, or lesions   All other review of systems is negative unless indicated above.    Physical Exam:  T(F): 98.3 (10-12), Max: 98.3 (10-12)  HR: 81 (10-12) (78 - 145)  BP: 112/77 (10-12) (102/68 - 144/78)  RR: 18 (10-12)  SpO2: 97% (10-12)  GENERAL: No acute distress, well-developed  HEAD:  Atraumatic, Normocephalic  ENT: EOMI, PERRLA, conjunctiva and sclera clear, Neck supple, No JVD, moist mucosa  CHEST/LUNG: Clear to auscultation bilaterally; No wheeze, equal breath sounds bilaterally   BACK: No spinal tenderness  HEART: Regular rate and rhythm; No murmurs, rubs, or gallops  ABDOMEN: Soft, Nontender, Nondistended; Bowel sounds present  EXTREMITIES:  No clubbing, cyanosis, or edema  PSYCH: Nl behavior, nl affect  NEUROLOGY: AAOx3, non-focal, cranial nerves intact  SKIN: Normal color, No rashes or lesions  LINES:    Cardiovascular Diagnostic Testing:    ECG: Personally reviewed:    Echo: Personally reviewed:    Stress Testing:    Cath:    Imaging:    CXR: Personally reviewed    Labs: Personally reviewed                        14.1   8.33  )-----------( 198      ( 11 Oct 2023 19:00 )             41.5     10-11    137  |  98  |  16  ----------------------------<  126<H>  3.6   |  26  |  0.88    Ca    10.8<H>      11 Oct 2023 19:00  Mg     1.8     10-12    TPro  8.0  /  Alb  4.4  /  TBili  0.5  /  DBili  x   /  AST  36  /  ALT  30  /  AlkPhos  54  10-11    PT/INR - ( 11 Oct 2023 19:00 )   PT: 11.1 sec;   INR: 1.01 ratio         PTT - ( 11 Oct 2023 19:00 )  PTT:31.8 sec    CARDIAC MARKERS ( 12 Oct 2023 00:13 )  12 ng/L / x     / x     / x     / x     / x      CARDIAC MARKERS ( 11 Oct 2023 19:00 )  6 ng/L / x     / x     / x     / x     / x                     Amado Lilly MD  Cardiology Fellow  All Cardiology service information can be found 24/7 on [email or web address], password: [email or web address]    Patient seen and evaluated at bedside    Chief Complaint:    HPI:  89 yo F with PMHx paroxysmal a-fib on Eliquis, HTN, and hypothyroidism who presents for a fall at home. She reports she was at the refrigerator and does not remember falling but she ended up on the ground. Reports head strike and does not recall if she lost consciousness or not. On arrival to ED, vital signs were stable and was in sinus rhythm, and workup was initiated for her fall, but then heart rhythm converted to rapid irregular rhythm with both wide and narrow complex QRS to rates up to 200 but mostly 160s. At that time, patient's BP remained stable in the 110s-120s/70s. She does report feeling palpitations but no chest pain or pressure and no SOB. She reports that she does take her Eliquis and all of her medication at home. Home dose of 25mg PO metoprolol tartrate as well as 5mg IV metoprolol tartrate were given, and HR decreased to 130s and was narrow complex atrial flutter. Later, she converted back to SR in 90s.     Currently, the patient feels well and endorses the above history, denying any current complaints. She denies CP, SOB, dyspnea on exertion. She does endorse lower extremity swelling for the past month. She states she does take a diuretic at home.     PMHx:   PAF (paroxysmal atrial fibrillation)    Essential hypertension    Glaucoma    Hypothyroidism        PSHx:   No significant past surgical history        Allergies:  No Known Allergies      Home Medications:  Eliquis 2.5 mg oral tablet: 1 tab(s) orally 2 times a day (11 Oct 2023 22:55)  hydroCHLOROthiazide 12.5 mg oral tablet: 1 tab(s) orally once a day (11 Oct 2023 22:55)  latanoprost 0.005% ophthalmic solution: 1 drop(s) in each eye once a day (at bedtime) BOTH EYES (11 Oct 2023 22:56)  Lipitor 20 mg oral tablet: 1 tab(s) orally once a day (at bedtime) (11 Oct 2023 22:56)  losartan 25 mg oral tablet: 1 tab(s) orally once a day (11 Oct 2023 22:56)  metoprolol tartrate 25 mg oral tablet: 1 tab(s) orally 2 times a day (11 Oct 2023 22:58)  Synthroid 50 mcg (0.05 mg) oral tablet: 1 tab(s) orally once a day (11 Oct 2023 22:57)      Current Medications:   acetaminophen     Tablet .. 650 milliGRAM(s) Oral every 6 hours PRN  apixaban 2.5 milliGRAM(s) Oral two times a day  atorvastatin 20 milliGRAM(s) Oral at bedtime  latanoprost 0.005% Ophthalmic Solution 1 Drop(s) Both EYES <User Schedule>      FAMILY HISTORY:  No pertinent family history in first degree relatives        Social History: Lives with son  Smoking History: Denies  Alcohol Use: Denies  Drug Use:    REVIEW OF SYSTEMS:  CONSTITUTIONAL: No weakness, fevers or chills  RESPIRATORY: No cough, wheezing, hemoptysis; No shortness of breath  CARDIOVASCULAR: No chest pain. + palpitations; +lower extremity edema  MSK: Arm pain  GENITOURINARY: No dysuria or hematuria. + Urinary frequency/nocturia  SKIN: No itching, burning, rashes, or lesions   All other review of systems is negative unless indicated above.    Physical Exam:  T(F): 98.3 (10-12), Max: 98.3 (10-12)  HR: 81 (10-12) (78 - 145)  BP: 112/77 (10-12) (102/68 - 144/78)  RR: 18 (10-12)  SpO2: 97% (10-12)  GENERAL: No acute distress, well-developed woman  HEAD:  Atraumatic, Normocephalic  ENT: EOMI, PERRLA, conjunctiva and sclera clear, No JVD, moist mucosa  CHEST/LUNG: Clear to auscultation bilaterally; No wheeze, equal breath sounds bilaterally   HEART: Regular rate and rhythm; No murmurs, rubs, or gallops  ABDOMEN: Soft, Nontender, Nondistended; Bowel sounds present  EXTREMITIES:  No clubbing, cyanosis, or edema  PSYCH: Nl behavior, nl affect  NEUROLOGY: AAOx3, non-focal, cranial nerves intact  SKIN: Normal color, No rashes or lesions    LINES:    Cardiovascular Diagnostic Testing:    ECG: Personally reviewed:  Initial ECG 10/11 6:37 PM - Sinus rhythm, narrow complex QRS  Later 10/11 10:11 PM - Atrial fibrillation with intermittently conducted aberrant RBBB morphology complexes.    Echo: Personally reviewed:    Stress Testing:    Cath:    Imaging:    CXR: Personally reviewed  Clear lungs    Labs: Personally reviewed                        14.1   8.33  )-----------( 198      ( 11 Oct 2023 19:00 )             41.5     10-11    137  |  98  |  16  ----------------------------<  126<H>  3.6   |  26  |  0.88    Ca    10.8<H>      11 Oct 2023 19:00  Mg     1.8     10-12    TPro  8.0  /  Alb  4.4  /  TBili  0.5  /  DBili  x   /  AST  36  /  ALT  30  /  AlkPhos  54  10-11    PT/INR - ( 11 Oct 2023 19:00 )   PT: 11.1 sec;   INR: 1.01 ratio         PTT - ( 11 Oct 2023 19:00 )  PTT:31.8 sec    CARDIAC MARKERS ( 12 Oct 2023 00:13 )  12 ng/L / x     / x     / x     / x     / x      CARDIAC MARKERS ( 11 Oct 2023 19:00 )  6 ng/L / x     / x     / x     / x     / x        Telemetry: a-fib with RVR, aberrantly conducted complexes, possible brief runs of NSVT, later converted to a-flutter and then back to sinus rhythm       Amado Lilly MD  Cardiology Fellow  All Cardiology service information can be found 24/7 on [email or web address], password: [email or web address]    Patient seen and evaluated at bedside    Chief Complaint:    HPI:  87 yo F with PMHx paroxysmal a-fib on Eliquis, HTN, and hypothyroidism who presents for a fall at home. She reports she was at the refrigerator and does not remember falling but she ended up on the ground. Reports head strike and does not recall if she lost consciousness or not. On arrival to ED, vital signs were stable and was in sinus rhythm, and workup was initiated for her fall, but then heart rhythm converted to rapid irregular rhythm with both wide and narrow complex QRS to rates up to 200 but mostly 160s. At that time, patient's BP remained stable in the 110s-120s/70s. She does report feeling palpitations but no chest pain or pressure and no SOB. She reports that she does take her Eliquis and all of her medication at home. Home dose of 25mg PO metoprolol tartrate as well as 5mg IV metoprolol tartrate were given, and HR decreased to 130s and was narrow complex atrial flutter. Later, she converted back to SR in 90s.     Currently, the patient feels well and endorses the above history, denying any current complaints. She denies CP, SOB, dyspnea on exertion. She does endorse lower extremity swelling for the past month. She states she does take a diuretic at home.     PMHx:   PAF (paroxysmal atrial fibrillation)    Essential hypertension    Glaucoma    Hypothyroidism        PSHx:   No significant past surgical history        Allergies:  No Known Allergies      Home Medications:  Eliquis 2.5 mg oral tablet: 1 tab(s) orally 2 times a day (11 Oct 2023 22:55)  hydroCHLOROthiazide 12.5 mg oral tablet: 1 tab(s) orally once a day (11 Oct 2023 22:55)  latanoprost 0.005% ophthalmic solution: 1 drop(s) in each eye once a day (at bedtime) BOTH EYES (11 Oct 2023 22:56)  Lipitor 20 mg oral tablet: 1 tab(s) orally once a day (at bedtime) (11 Oct 2023 22:56)  losartan 25 mg oral tablet: 1 tab(s) orally once a day (11 Oct 2023 22:56)  metoprolol tartrate 25 mg oral tablet: 1 tab(s) orally 2 times a day (11 Oct 2023 22:58)  Synthroid 50 mcg (0.05 mg) oral tablet: 1 tab(s) orally once a day (11 Oct 2023 22:57)      Current Medications:   acetaminophen     Tablet .. 650 milliGRAM(s) Oral every 6 hours PRN  apixaban 2.5 milliGRAM(s) Oral two times a day  atorvastatin 20 milliGRAM(s) Oral at bedtime  latanoprost 0.005% Ophthalmic Solution 1 Drop(s) Both EYES <User Schedule>      FAMILY HISTORY:  No pertinent family history in first degree relatives        Social History: Lives with son  Smoking History: Denies  Alcohol Use: Denies  Drug Use:    REVIEW OF SYSTEMS:  CONSTITUTIONAL: No weakness, fevers or chills  RESPIRATORY: No cough, wheezing, hemoptysis; No shortness of breath  CARDIOVASCULAR: No chest pain. + palpitations; +lower extremity edema  MSK: Arm pain  GENITOURINARY: No dysuria or hematuria. + Urinary frequency/nocturia  SKIN: No itching, burning, rashes, or lesions   All other review of systems is negative unless indicated above.    Physical Exam:  T(F): 98.3 (10-12), Max: 98.3 (10-12)  HR: 81 (10-12) (78 - 145)  BP: 112/77 (10-12) (102/68 - 144/78)  RR: 18 (10-12)  SpO2: 97% (10-12)  GENERAL: No acute distress, well-developed woman  HEAD:  Atraumatic, Normocephalic  ENT: EOMI, PERRLA, conjunctiva and sclera clear, No JVD, moist mucosa  CHEST/LUNG: Clear to auscultation bilaterally; No wheeze, equal breath sounds bilaterally   HEART: Regular rate and rhythm; No murmurs, rubs, or gallops  ABDOMEN: Soft, Nontender, Nondistended; Bowel sounds present  EXTREMITIES:  No clubbing, cyanosis, or edema  PSYCH: Nl behavior, nl affect  NEUROLOGY: AAOx3, non-focal, cranial nerves intact  SKIN: Normal color, No rashes or lesions    LINES:    Cardiovascular Diagnostic Testing:    ECG: Personally reviewed:  Initial ECG 10/11 6:37 PM - Sinus rhythm, narrow complex QRS  Later 10/11 10:11 PM - Atrial fibrillation with intermittently conducted aberrant RBBB morphology complexes.    Echo: Personally reviewed:    Stress Testing:    Cath:    Imaging:    CXR: Personally reviewed  Clear lungs    Labs: Personally reviewed                        14.1   8.33  )-----------( 198      ( 11 Oct 2023 19:00 )             41.5     10-11    137  |  98  |  16  ----------------------------<  126<H>  3.6   |  26  |  0.88    Ca    10.8<H>      11 Oct 2023 19:00  Mg     1.8     10-12    TPro  8.0  /  Alb  4.4  /  TBili  0.5  /  DBili  x   /  AST  36  /  ALT  30  /  AlkPhos  54  10-11    PT/INR - ( 11 Oct 2023 19:00 )   PT: 11.1 sec;   INR: 1.01 ratio         PTT - ( 11 Oct 2023 19:00 )  PTT:31.8 sec    CARDIAC MARKERS ( 12 Oct 2023 00:13 )  12 ng/L / x     / x     / x     / x     / x      CARDIAC MARKERS ( 11 Oct 2023 19:00 )  6 ng/L / x     / x     / x     / x     / x        Telemetry: a-fib with RVR, aberrantly conducted complexes, possible brief runs of NSVT, later converted to a-flutter and then back to sinus rhythm

## 2023-10-12 NOTE — CHART NOTE - NSCHARTNOTEFT_GEN_A_CORE
Medicine PA Note     Notified by RN that patient's HR remains in the 130s, and occasionally to 160s after receiving 25mg of PO Lopressor and 5mg of IV Lopressor.   The patient's BP: ~99/70s.   Discussed with cardiac fellow, Dr. Lilly, advised no further intervention until he evaluates the patient.   Upon assessing the patient, the patient's AFIB RVR resolved and is now rate-controlled in the 80s.   EKG is performed at bedside for confirmation   Will continue to monitor patient's vitals closely overnight   Endorse/sign out to day team on overnight events   RN aware of management     Leticia Haywood PA-C  Dept of Medicine   [id number] Medicine PA Note     Notified by RN that patient's HR remains in the 130s, and occasionally to 160s after receiving 25mg of PO Lopressor and 5mg of IV Lopressor.   The patient's BP: ~99/70s.   Discussed with cardiac fellow, Dr. Lilly, advised no further intervention until he evaluates the patient.   Upon assessing the patient, the patient's AFIB RVR resolved and is now rate-controlled in the 80s.   EKG is performed at bedside for confirmation   Will continue to monitor patient's vitals closely overnight   Endorse/sign out to day team on overnight events   RN aware of management     Leticia Haywood PA-C  Dept of Medicine   [id number] Medicine PA Note     Notified by RN that patient's HR remains in the 130s, and occasionally to 160s after receiving 25mg of PO Lopressor and 5mg of IV Lopressor.   The patient's BP: ~99/70s.   Discussed with cardiac fellow, Dr. Lilly, advised no further intervention until he evaluates the patient.   Upon assessing the patient, the patient's AFIB RVR resolved and is now rate-controlled in the 80s.   EKG is performed at bedside for confirmation   Will continue to monitor patient's vitals closely overnight   Endorse/sign out to day team on overnight events   RN aware of management     Leticia Haywood PA-C  Dept of Medicine   [id number]

## 2023-10-12 NOTE — PROGRESS NOTE ADULT - PROBLEM SELECTOR PLAN 2
- Cardiology consult appreciated  - Rate control with metoprolol (may need to titrate)  - Monitor on telemetry  - TTE  - Eliquis 2.5 mg BID (though borderline weight for reduction)

## 2023-10-12 NOTE — PROGRESS NOTE ADULT - PROBLEM SELECTOR PLAN 4
- TSH 8, add on FT4  - Continue with Synthroid 50 mcg daily  - Unclear if she is compliant and if she takes on an empty stomach. If FT4 is low may consider endocrine

## 2023-10-12 NOTE — PHYSICAL THERAPY INITIAL EVALUATION ADULT - GAIT DEVIATIONS NOTED, PT EVAL
decreased andres/increased time in double stance/decreased velocity of limb motion/decreased step length/decreased weight-shifting ability

## 2023-10-12 NOTE — PROGRESS NOTE ADULT - SUBJECTIVE AND OBJECTIVE BOX
Patient is a 88y old  Female who presents with a chief complaint of S/P presumed mechanical fall at 11AM while opening her refrigerator. (12 Oct 2023 09:19)      SUBJECTIVE / OVERNIGHT EVENTS: Patient seen and examined at bedside. No acute events overnight. Patient unable to tell me exact circumstances of fall, but states she did not lose consciousness. Feels okay at the moment.    MEDICATIONS  (STANDING):  apixaban 2.5 milliGRAM(s) Oral two times a day  atorvastatin 20 milliGRAM(s) Oral at bedtime  latanoprost 0.005% Ophthalmic Solution 1 Drop(s) Both EYES <User Schedule>  levothyroxine 50 MICROGram(s) Oral daily  metoprolol tartrate 25 milliGRAM(s) Oral two times a day    MEDICATIONS  (PRN):  acetaminophen     Tablet .. 650 milliGRAM(s) Oral every 6 hours PRN Temp greater or equal to 38C (100.4F), Mild Pain (1 - 3)      CAPILLARY BLOOD GLUCOSE        I&O's Summary      PHYSICAL EXAM:  Vital Signs Last 24 Hrs  T(C): 36.8 (12 Oct 2023 05:01), Max: 36.8 (12 Oct 2023 02:39)  T(F): 98.3 (12 Oct 2023 05:01), Max: 98.3 (12 Oct 2023 02:39)  HR: 88 (12 Oct 2023 05:01) (78 - 145)  BP: 110/71 (12 Oct 2023 05:01) (102/68 - 144/78)  BP(mean): --  RR: 18 (12 Oct 2023 05:01) (16 - 19)  SpO2: 99% (12 Oct 2023 05:01) (97% - 100%)    Parameters below as of 12 Oct 2023 05:01  Patient On (Oxygen Delivery Method): room air        GEN: female in NAD, appears comfortable, no diaphoresis  EYES: No scleral injection, EOMI  ENTM: neck supple & symmetric without tracheal deviation, moist membranes, no gross hearing impairment, thyroid gland not enlarged  CV: +S1/S2, no m/r/g, no abdominal bruit, no LE edema  RESP: breathing comfortably, no respiratory accessory muscle use, CTAB, no w/r/r  GI: normoactive BS, soft, NTND, no rebounding/guarding, no palpable masses    LABS:                        13.5   6.27  )-----------( 222      ( 12 Oct 2023 06:56 )             40.6     10-12    141  |  105  |  19  ----------------------------<  123<H>  3.8   |  24  |  0.92    Ca    9.4      12 Oct 2023 06:56  Mg     1.8     10-12    TPro  8.0  /  Alb  4.4  /  TBili  0.5  /  DBili  x   /  AST  36  /  ALT  30  /  AlkPhos  54  10-11    PT/INR - ( 11 Oct 2023 19:00 )   PT: 11.1 sec;   INR: 1.01 ratio         PTT - ( 11 Oct 2023 19:00 )  PTT:31.8 sec      Urinalysis Basic - ( 12 Oct 2023 06:56 )    Color: x / Appearance: x / SG: x / pH: x  Gluc: 123 mg/dL / Ketone: x  / Bili: x / Urobili: x   Blood: x / Protein: x / Nitrite: x   Leuk Esterase: x / RBC: x / WBC x   Sq Epi: x / Non Sq Epi: x / Bacteria: x          RADIOLOGY & ADDITIONAL TESTS:  Results Reviewed:   Imaging Personally Reviewed:  Electrocardiogram Personally Reviewed:    COORDINATION OF CARE:  Care Discussed with Consultants/Other Providers [Y/N]:  Prior or Outpatient Records Reviewed [Y/N]:

## 2023-10-12 NOTE — PROGRESS NOTE ADULT - PROBLEM SELECTOR PLAN 1
- At this point likely mechanical until more information comes to light  - PT consult  - Perform orthostatics

## 2023-10-12 NOTE — PHYSICAL THERAPY INITIAL EVALUATION ADULT - GAIT DISTANCE, PT EVAL
Data: Angelika Bacon transferred to 430 via bed at 1305. Baby transferred via parent's arms.  Action: Receiving unit notified of transfer: Yes. Patient and family notified of room change. Report given to Etta MALIK RN at 1310. Belongings sent to receiving unit. Accompanied by Registered Nurse. Oriented patient to surroundings. Call light within reach. ID bands double-checked with receiving RN.  Response: Patient tolerated transfer and is stable.  
VSS, fundus firm, light flow. Had shower today, drsg. removed, inc. intact with steri strips. Hgb. 8.8, IV Venofer ordered and adm. Taking Tylenol, Ibuprofen and Oxycodone for pain. Ice to inc. Breastfeeding her baby girl who is latching well. Using Lanolin for tender nipples.   
VSS. Incision C/D/I. Up to bathroom with assist x1. Pt able to void 75ml, post void bladder scan for 62 ml.  Uterus firm and midline. Pain controlled with tylenol and toradol. Working on breastfeeding .   
VSS. Minimal lochia. Pain controlled by tylenol, toradol, oxycodone x1. Ambulated to bathroom and farmer removed. Working on breastfeeding infant. No concerns at this time.  
Vital signs stable. Postpartum assessment WDL. Incision cdi. Pain controlled with tylenol, oxy,ibuprofen Patient ambulating well.voiding. Patient  passing gas. Breastfeeding on cue Patient and infant bonding well. Will continue with current plan of care.    
Vital signs stable. Postpartum assessment WDL. Incision dressing clean dry intact. Pain controlled with tylenol and Toradol. Patient ambulating well. Patient passing gas. Voiding adequately. Breastfeeding on cue with minimal assist. Patient and infant bonding well. Will continue with current plan of care.    
x2/50 feet

## 2023-10-12 NOTE — PHYSICAL THERAPY INITIAL EVALUATION ADULT - PERTINENT HX OF CURRENT PROBLEM, REHAB EVAL
Pt is 88F admitted 10/11/23 PMHx PAF apparently for 2 years maintained on apixaban 2.5 mg BID--patient with medication bottles--some are nearly full--with patient's blister pack  bedside (not used yet due to patient's Rx bottles still with Rx)--unclear to regular compliance with apixaban--son reports patient handles her own Rx--patient with a history of essential HTN, hypothyroidism, glaucoma,   Patient was apparently standing and opening her refrigerator door when patient fell backwards but could not clarify how she fell.  NO clear LOC.  Patient apparently was then brought to the ER.      CT Head: No acute intracranial hemorrhage or calvarial fracture.  CT cervical spine: No acute cervical spine fracture or evidence of traumatic malalignment.

## 2023-10-12 NOTE — PROGRESS NOTE ADULT - ASSESSMENT
88F with PMHx of chronic atrial fibrillation, HTN, glaucoma and hypothyroidism presenting with fall. Circumstances unclear, but imaging negative for fracture/hemorrhage. While in the ED went into AFIB with RVR w/ aberrancy. Patient may have poor medication compliance and admitted for further management.

## 2023-10-12 NOTE — CONSULT NOTE ADULT - ASSESSMENT
87 yo F with PMHx paroxysmal a-fib on Eliquis, HTN, and hypothyroidism who presents for a fall at home later converted to atrial fibrillation with RVR with aberrant conduction.    #Paroxysmal a-fib  #Fall, unclear if loss of consciousness    Recommendations:  - Continue metoprolol tartrate 25mg PO BID  - If patient develops recurrent RVR, can push 5mg IV metoprolol if BP allows  - Continue Eliquis, [id number] score at least 4  - Check TSH, A1c  - Would order TTE to eval for possible structural disease/cause of fall  - Obtain outpatient records re: a-fib history  - Monitor on telemetry  - Maintain K>4, Mg>2    ***Note not finalized until co-signed by attending***     Amado Lilly MD  Cardiology Fellow  All Cardiology service information can be found 24/7 on [email or web address], password: [email or web address]  89 yo F with PMHx paroxysmal a-fib on Eliquis, HTN, and hypothyroidism who presents for a fall at home later converted to atrial fibrillation with RVR with aberrant conduction.    #Paroxysmal a-fib  #Fall, unclear if loss of consciousness    Recommendations:  - Continue metoprolol tartrate 25mg PO BID  - If patient develops recurrent RVR, can push 5mg IV metoprolol if BP allows  - Continue Eliquis, [id number] score at least 4  - Check TSH, A1c  - Would order TTE to eval for possible structural disease/cause of fall  - Obtain outpatient records re: a-fib history  - Monitor on telemetry  - Maintain K>4, Mg>2    ***Note not finalized until co-signed by attending***     Amado Lilly MD  Cardiology Fellow  All Cardiology service information can be found 24/7 on [email or web address], password: [email or web address]  89 yo F with PMHx paroxysmal a-fib on Eliquis, HTN, and hypothyroidism who presents for a fall at home later converted to atrial fibrillation with RVR with aberrant conduction.    #Paroxysmal a-fib  #Fall, unclear if loss of consciousness    Recommendations:  - Continue metoprolol tartrate 25mg PO BID  - If patient develops recurrent RVR, can push 5mg IV metoprolol if BP allows  - Continue Eliquis, [id number] score at least 4  - Check TSH, A1c  - Would order TTE to eval for possible structural disease/cause of fall  - Obtain outpatient records re: a-fib history  - Monitor on telemetry  - Maintain K>4, Mg>2    ***Note not finalized until co-signed by attending***     Amado Lilly MD  Cardiology Fellow  All Cardiology service information can be found 24/7 on [email or web address], password: [email or web address]  89 yo F with PMHx paroxysmal a-fib on Eliquis, HTN, and hypothyroidism who presents for a fall at home later converted to atrial fibrillation with RVR with aberrant conduction.    #Paroxysmal a-fib  #Fall, unclear if loss of consciousness    Recommendations:  - Continue metoprolol tartrate 25mg PO BID  - If patient develops recurrent RVR, can push 5mg IV metoprolol if BP allows  - Continue Eliquis, [id number]-[id number] score at least 4  - Check TSH, A1c  - Would order TTE to eval for possible structural disease/cause of fall  - Obtain outpatient records re: a-fib history  - Monitor on telemetry  - Maintain K>4, Mg>2    ***Note not finalized until co-signed by attending***     Amado Lilly MD  Cardiology Fellow  All Cardiology service information can be found 24/7 on [email or web address], password: [email or web address]     This patient was seen and examined personally by me and the plan was discussed with the fellow and/or resident above. Amendments were made as necessary to the above. Agree with the excellent note and plan above. 88F w pAF on eliquis, HTN, hypothyroid here after fall. AF RVR with aberrant conduction and NSVT, now converted to NSR.  -tele  -rpt 12 lead ecg  -cont metoprolol tartrate 25 bid  -cont eliquis  -TTE pending  -outpt rhythm monitor    Lei Liang MD, MPhil, [facility]  Cardiologist, [facility]  ; Vu [facility] of [facility] and [facility]  Email: [email or web address] [email or web address] Cardiovascular Surgery on-service contact/call information, go to [email or web address] and use "[email or web address]" to login.  Outpatient Cardiology appointments, call [phone] to arrange with a colleague; I do not have outpatient Cardiology clinic. 89 yo F with PMHx paroxysmal a-fib on Eliquis, HTN, and hypothyroidism who presents for a fall at home later converted to atrial fibrillation with RVR with aberrant conduction.    #Paroxysmal a-fib  #Fall, unclear if loss of consciousness    Recommendations:  - Continue metoprolol tartrate 25mg PO BID  - If patient develops recurrent RVR, can push 5mg IV metoprolol if BP allows  - Continue Eliquis, [id number] score at least 4  - Check TSH, A1c  - Would order TTE to eval for possible structural disease/cause of fall  - Obtain outpatient records re: a-fib history  - Monitor on telemetry  - Maintain K>4, Mg>2    ***Note not finalized until co-signed by attending***     Amado Lilly MD  Cardiology Fellow  All Cardiology service information can be found 24/7 on [email or web address], password: [email or web address]     This patient was seen and examined personally by me and the plan was discussed with the fellow and/or resident above. Amendments were made as necessary to the above. Agree with the excellent note and plan above. 88F w pAF on eliquis, HTN, hypothyroid here after fall. AF RVR with aberrant conduction and NSVT, now converted to NSR.  -tele  -rpt 12 lead ecg  -cont metoprolol tartrate 25 bid  -cont eliquis  -TTE pending  -outpt rhythm monitor    Lei Liang MD, MPhil, [facility]  Cardiologist, [facility]  ; Vu [facility] of [facility] and [facility]  Email: [email or web address] Cardiovascular Surgery on-service contact/call information, go to [email or web address] and use "[facility]" to login.  Outpatient Cardiology appointments, call [phone] to arrange with a colleague; I do not have outpatient Cardiology clinic. 87 yo F with PMHx paroxysmal a-fib on Eliquis, HTN, and hypothyroidism who presents for a fall at home later converted to atrial fibrillation with RVR with aberrant conduction.    #Paroxysmal a-fib  #Fall, unclear if loss of consciousness    Recommendations:  - Continue metoprolol tartrate 25mg PO BID  - If patient develops recurrent RVR, can push 5mg IV metoprolol if BP allows  - Continue Eliquis, [id number] score at least 4  - Check TSH, A1c  - Would order TTE to eval for possible structural disease/cause of fall  - Obtain outpatient records re: a-fib history  - Monitor on telemetry  - Maintain K>4, Mg>2    ***Note not finalized until co-signed by attending***     Amado Lilly MD  Cardiology Fellow  All Cardiology service information can be found 24/7 on [email or web address], password: [employer]     This patient was seen and examined personally by me and the plan was discussed with the fellow and/or resident above. Amendments were made as necessary to the above. Agree with the excellent note and plan above. 88F w pAF on eliquis, HTN, hypothyroid here after fall. AF RVR with aberrant conduction and NSVT, now converted to NSR.  -tele  -rpt 12 lead ecg  -cont metoprolol tartrate 25 bid  -cont eliquis  -TTE pending  -outpt rhythm monitor    Lei Liang MD, MPhil, [facility]  Cardiologist, [facility]  ; Vu [facility] of [facility] and [facility]  Email: [email or web address] [email or web address] Cardiovascular Surgery on-service contact/call information, go to [email or web address] and use "[employer]" to login.  Outpatient Cardiology appointments, call [phone] to arrange with a colleague; I do not have outpatient Cardiology clinic.

## 2023-10-12 NOTE — PHYSICAL THERAPY INITIAL EVALUATION ADULT - PLANNED THERAPY INTERVENTIONS, PT EVAL
GOALS: Pt will negotiate 5 steps with handrail & step to pattern with independence in 4wks/bed mobility training/gait training/transfer training

## 2023-10-13 ENCOUNTER — TRANSCRIPTION ENCOUNTER (OUTPATIENT)
Age: 88
End: 2023-10-13

## 2023-10-13 VITALS
RESPIRATION RATE: 18 BRPM | TEMPERATURE: 98 F | OXYGEN SATURATION: 98 % | HEART RATE: 77 BPM | SYSTOLIC BLOOD PRESSURE: 115 MMHG | DIASTOLIC BLOOD PRESSURE: 69 MMHG

## 2023-10-13 PROCEDURE — 99285 EMERGENCY DEPT VISIT HI MDM: CPT

## 2023-10-13 PROCEDURE — 84443 ASSAY THYROID STIM HORMONE: CPT

## 2023-10-13 PROCEDURE — 73080 X-RAY EXAM OF ELBOW: CPT

## 2023-10-13 PROCEDURE — 85610 PROTHROMBIN TIME: CPT

## 2023-10-13 PROCEDURE — 82435 ASSAY OF BLOOD CHLORIDE: CPT

## 2023-10-13 PROCEDURE — 84439 ASSAY OF FREE THYROXINE: CPT

## 2023-10-13 PROCEDURE — 84484 ASSAY OF TROPONIN QUANT: CPT

## 2023-10-13 PROCEDURE — 83036 HEMOGLOBIN GLYCOSYLATED A1C: CPT

## 2023-10-13 PROCEDURE — 99233 SBSQ HOSP IP/OBS HIGH 50: CPT

## 2023-10-13 PROCEDURE — 85025 COMPLETE CBC W/AUTO DIFF WBC: CPT

## 2023-10-13 PROCEDURE — 71045 X-RAY EXAM CHEST 1 VIEW: CPT

## 2023-10-13 PROCEDURE — 84295 ASSAY OF SERUM SODIUM: CPT

## 2023-10-13 PROCEDURE — 82947 ASSAY GLUCOSE BLOOD QUANT: CPT

## 2023-10-13 PROCEDURE — 85018 HEMOGLOBIN: CPT

## 2023-10-13 PROCEDURE — 80048 BASIC METABOLIC PNL TOTAL CA: CPT

## 2023-10-13 PROCEDURE — 99239 HOSP IP/OBS DSCHRG MGMT >30: CPT

## 2023-10-13 PROCEDURE — 82330 ASSAY OF CALCIUM: CPT

## 2023-10-13 PROCEDURE — 73090 X-RAY EXAM OF FOREARM: CPT

## 2023-10-13 PROCEDURE — 72170 X-RAY EXAM OF PELVIS: CPT

## 2023-10-13 PROCEDURE — 83605 ASSAY OF LACTIC ACID: CPT

## 2023-10-13 PROCEDURE — 82803 BLOOD GASES ANY COMBINATION: CPT

## 2023-10-13 PROCEDURE — 85730 THROMBOPLASTIN TIME PARTIAL: CPT

## 2023-10-13 PROCEDURE — 85014 HEMATOCRIT: CPT

## 2023-10-13 PROCEDURE — 97161 PT EVAL LOW COMPLEX 20 MIN: CPT

## 2023-10-13 PROCEDURE — 93306 TTE W/DOPPLER COMPLETE: CPT

## 2023-10-13 PROCEDURE — 80053 COMPREHEN METABOLIC PANEL: CPT

## 2023-10-13 PROCEDURE — 93356 MYOCRD STRAIN IMG SPCKL TRCK: CPT

## 2023-10-13 PROCEDURE — 83735 ASSAY OF MAGNESIUM: CPT

## 2023-10-13 PROCEDURE — 84132 ASSAY OF SERUM POTASSIUM: CPT

## 2023-10-13 PROCEDURE — 72125 CT NECK SPINE W/O DYE: CPT | Mod: MA

## 2023-10-13 PROCEDURE — 70450 CT HEAD/BRAIN W/O DYE: CPT | Mod: MA

## 2023-10-13 RX ORDER — CHLORHEXIDINE GLUCONATE 213 G/1000ML
1 SOLUTION TOPICAL
Refills: 0 | Status: DISCONTINUED | OUTPATIENT
Start: 2023-10-13 | End: 2023-10-13

## 2023-10-13 RX ADMIN — Medication 25 MILLIGRAM(S): at 06:00

## 2023-10-13 RX ADMIN — APIXABAN 2.5 MILLIGRAM(S): 2.5 TABLET, FILM COATED ORAL at 06:00

## 2023-10-13 RX ADMIN — Medication 50 MICROGRAM(S): at 06:00

## 2023-10-13 NOTE — DISCHARGE NOTE PROVIDER - NSDCPNSUBOBJ_GEN_ALL_CORE
Patient seen and examined at bedside. No acute events overnight. No CP/SOB. NSR on telemetry. Denies weakness, but feels tired because she couldn't sleep. Thinks she's urinating a lot, but no dysuria. Patient understands importance of OP follow up with cardiologist and primary care physician.

## 2023-10-13 NOTE — DISCHARGE NOTE PROVIDER - PROVIDER TOKENS
PROVIDER:[TOKEN:[[id number]],FOLLOWUP:[2 weeks],ESTABLISHEDPATIENT:[T]] PROVIDER:[TOKEN:[[id number]],FOLLOWUP:[2 weeks],ESTABLISHEDPATIENT:[T]] PROVIDER:[TOKEN:[[id number]],FOLLOWUP:[2 weeks],ESTABLISHEDPATIENT:[T]]

## 2023-10-13 NOTE — DISCHARGE NOTE NURSING/CASE MANAGEMENT/SOCIAL WORK - PATIENT PORTAL LINK FT
You can access the FollowMyHealth Patient Portal offered by [facility] by registering at the following website: [email or web address]. By joining [employer]’s FollowMyHealth portal, you will also be able to view your health information using other applications (apps) compatible with our system. You can access the FollowMyHealth Patient Portal offered by [facility] by registering at the following website: [email or web address]. By joining [employer]’s FollowMyHealth portal, you will also be able to view your health information using other applications (apps) compatible with our system. You can access the FollowMyHealth Patient Portal offered by [facility] by registering at the following website: [email or web address]. By joining [employer]’s FollowMyHealth portal, you will also be able to view your health information using other applications (apps) compatible with our system.

## 2023-10-13 NOTE — PATIENT PROFILE ADULT - FALL HARM RISK - HARM RISK INTERVENTIONS
Communicate Risk of Fall with Harm to all staff/Reinforce activity limits and safety measures with patient and family/Tailored Fall Risk Interventions/Visual Cue: Yellow wristband and red socks/Bed in lowest position, wheels locked, appropriate side rails in place/Call bell, personal items and telephone in reach/Instruct patient to call for assistance before getting out of bed or chair/Non-slip footwear when patient is out of bed/[place] to call system/Physically safe environment - no spills, clutter or unnecessary equipment/Purposeful Proactive Rounding/Room/bathroom lighting operational, light cord in reach Communicate Risk of Fall with Harm to all staff/Reinforce activity limits and safety measures with patient and family/Tailored Fall Risk Interventions/Visual Cue: Yellow wristband and red socks/Bed in lowest position, wheels locked, appropriate side rails in place/Call bell, personal items and telephone in reach/Instruct patient to call for assistance before getting out of bed or chair/Non-slip footwear when patient is out of bed/[place] to call system/Physically safe environment - no spills, clutter or unnecessary equipment/Purposeful Proactive Rounding/Room/bathroom lighting operational, light cord in reach Communicate Risk of Fall with Harm to all staff/Reinforce activity limits and safety measures with patient and family/Tailored Fall Risk Interventions/Visual Cue: Yellow wristband and red socks/Bed in lowest position, wheels locked, appropriate side rails in place/Call bell, personal items and telephone in reach/Instruct patient to call for assistance before getting out of bed or chair/Non-slip footwear when patient is out of bed/[place] to call system/Physically safe environment - no spills, clutter or unnecessary equipment/Purposeful Proactive Rounding/Room/bathroom lighting operational, light cord in reach Assistance with ambulation/Assistance OOB with selected safe patient handling equipment/Communicate Risk of Fall with Harm to all staff/Discuss with provider need for PT consult/Monitor gait and stability/Provide patient with walking aids - walker, cane, crutches/Reinforce activity limits and safety measures with patient and family/Tailored Fall Risk Interventions/Visual Cue: Yellow wristband and red socks/Bed in lowest position, wheels locked, appropriate side rails in place/Call bell, personal items and telephone in reach/Instruct patient to call for assistance before getting out of bed or chair/Non-slip footwear when patient is out of bed/[place] to call system/Physically safe environment - no spills, clutter or unnecessary equipment/Purposeful Proactive Rounding/Room/bathroom lighting operational, light cord in reach Assistance with ambulation/Assistance OOB with selected safe patient handling equipment/Communicate Risk of Fall with Harm to all staff/Discuss with provider need for PT consult/Monitor gait and stability/Provide patient with walking aids - walker, cane, crutches/Reinforce activity limits and safety measures with patient and family/Tailored Fall Risk Interventions/Visual Cue: Yellow wristband and red socks/Bed in lowest position, wheels locked, appropriate side rails in place/Call bell, personal items and telephone in reach/Instruct patient to call for assistance before getting out of bed or chair/Non-slip footwear when patient is out of bed/[place] to call system/Physically safe environment - no spills, clutter or unnecessary equipment/Purposeful Proactive Rounding/Room/bathroom lighting operational, light cord in reach Assistance with ambulation/Assistance OOB with selected safe patient handling equipment/Communicate Risk of Fall with Harm to all staff/Discuss with provider need for PT consult/Monitor gait and stability/Provide patient with walking aids - walker, cane, crutches/Reinforce activity limits and safety measures with patient and family/Tailored Fall Risk Interventions/Visual Cue: Yellow wristband and red socks/Bed in lowest position, wheels locked, appropriate side rails in place/Call bell, personal items and telephone in reach/Instruct patient to call for assistance before getting out of bed or chair/Non-slip footwear when patient is out of bed/[place] to call system/Physically safe environment - no spills, clutter or unnecessary equipment/Purposeful Proactive Rounding/Room/bathroom lighting operational, light cord in reach

## 2023-10-13 NOTE — DISCHARGE NOTE PROVIDER - ATTENDING DISCHARGE PHYSICAL EXAMINATION:
T(C): 36.7 (10-13-23 @ 11:30), Max: 36.7 (10-13-23 @ 04:00)  HR: 77 (10-13-23 @ 11:30) (74 - 80)  BP: 115/69 (10-13-23 @ 11:30) (110/73 - 143/85)  RR: 18 (10-13-23 @ 11:30) (17 - 18)  SpO2: 98% (10-13-23 @ 11:30) (98% - 99%)    GEN: female in NAD, appears comfortable, no diaphoresis  EYES: No scleral injection, PERRL, EOMI  ENTM: neck supple & symmetric without tracheal deviation, moist membranes, no gross hearing impairment, thyroid gland not enlarged  CV: +S1/S2, no m/r/g, no abdominal bruit, no LE edema  RESP: breathing comfortably, no respiratory accessory muscle use, CTAB, no w/r/r  GI: normoactive BS, soft, NTND, no rebounding/guarding, no palpable masses  LYMPHATICS: no LAD or tenderness to palpation  NEURO: AOx3, no focal deficits, CNII-XII grossly intact  PSYCH: No SI/HI/AVH, appropriate affect, appropriate insight/judgment   SKIN: no petechiae, ecchymosis or maculopapular rash noted

## 2023-10-13 NOTE — DISCHARGE NOTE PROVIDER - HOSPITAL COURSE
HPI:  NIGHT HOSPITALIST:    Patient UNKNOWN to me previously, assigned to me at this point via the ER to admit this 87 y/o F--followed by her PCP above--patient's adult son in attendance--patient with a history of PAF apparently for 2 years maintained on apixaban 2.5 mg BID--patient with medication bottles--some are nearly full--with patient's blister pack  bedside (not used yet due to patient's Rx bottles still with Rx)--unclear to regular compliance with apixaban--son reports patient handles her own Rx--patient with a history of essential HTN, hypothyroidism, glaucoma,   Patient was apparently standing and opening her refrigerator door when patient fell backwards but could not clarify how she fell.  NO clear LOC.  Patient apparently was then brought to the ER.   Patient denies chest pain/pressure.  NO palpitations.  NO fever, no chills, no rigors.  NO cough.  No dyspnoea.   Patient reports she normally ambulates at home but appears to self limit her activity.  NO HA, no focal weakness.  NO abdominal pain, no red blood per rectum or melena.  No back pain, no tearing back pain.   NO dysuria, no hematuria. (11 Oct 2023 22:52)    Hospital Course:  Patient had negative imaging for fracture or hemorrhage (CT head CSpine and XR). She was found to be having episodes of afib w/ RVR. Unclear if she is compliant with medications. She was admitted to telemetry and with AVN blocking agents she converted to sinus rhythm. Patient seen by cardiology who recommended AC, rate control and OPP follow up with her cardiologist at [facility]. She had TTE which was grossly normal. Seen by PT who recommended home PT. Will simplify patient's home regimen by discontinuing diuretic and losartan. It was emphasized that she needs to follow up outpatient with PCP for blood pressure check.      Important Medication Changes and Reason: Stopped Losartan & HCTZ    Active or Pending Issues Requiring Follow-up: Cardiology    Advanced Directives:   [x] Full code  [ ] DNR  [ ] Hospice         HPI:  NIGHT HOSPITALIST:    Patient UNKNOWN to me previously, assigned to me at this point via the ER to admit this 89 y/o F--followed by her PCP above--patient's adult son in attendance--patient with a history of PAF apparently for 2 years maintained on apixaban 2.5 mg BID--patient with medication bottles--some are nearly full--with patient's blister pack  bedside (not used yet due to patient's Rx bottles still with Rx)--unclear to regular compliance with apixaban--son reports patient handles her own Rx--patient with a history of essential HTN, hypothyroidism, glaucoma,   Patient was apparently standing and opening her refrigerator door when patient fell backwards but could not clarify how she fell.  NO clear LOC.  Patient apparently was then brought to the ER.   Patient denies chest pain/pressure.  NO palpitations.  NO fever, no chills, no rigors.  NO cough.  No dyspnoea.   Patient reports she normally ambulates at home but appears to self limit her activity.  NO HA, no focal weakness.  NO abdominal pain, no red blood per rectum or melena.  No back pain, no tearing back pain.   NO dysuria, no hematuria. (11 Oct 2023 22:52)    Hospital Course:  Patient had negative imaging for fracture or hemorrhage (CT head CSpine and XR). She was found to be having episodes of afib w/ RVR. Unclear if she is compliant with medications. She was admitted to telemetry and with AVN blocking agents she converted to sinus rhythm. Patient seen by cardiology who recommended AC, rate control and OPP follow up with her cardiologist at [facility]. She had TTE which was grossly normal. Seen by PT who recommended home PT. Will simplify patient's home regimen by discontinuing diuretic and losartan. It was emphasized that she needs to follow up outpatient with PCP for blood pressure check.      Important Medication Changes and Reason: Stopped Losartan & HCTZ    Active or Pending Issues Requiring Follow-up: Cardiology    Advanced Directives:   [x] Full code  [ ] DNR  [ ] Hospice         HPI:  NIGHT HOSPITALIST:    Patient UNKNOWN to me previously, assigned to me at this point via the ER to admit this 89 y/o F--followed by her PCP above--patient's adult son in attendance--patient with a history of PAF apparently for 2 years maintained on apixaban 2.5 mg BID--patient with medication bottles--some are nearly full--with patient's blister pack  bedside (not used yet due to patient's Rx bottles still with Rx)--unclear to regular compliance with apixaban--son reports patient handles her own Rx--patient with a history of essential HTN, hypothyroidism, glaucoma,   Patient was apparently standing and opening her refrigerator door when patient fell backwards but could not clarify how she fell.  NO clear LOC.  Patient apparently was then brought to the ER.   Patient denies chest pain/pressure.  NO palpitations.  NO fever, no chills, no rigors.  NO cough.  No dyspnoea.   Patient reports she normally ambulates at home but appears to self limit her activity.  NO HA, no focal weakness.  NO abdominal pain, no red blood per rectum or melena.  No back pain, no tearing back pain.   NO dysuria, no hematuria. (11 Oct 2023 22:52)    Hospital Course:  Patient had negative imaging for fracture or hemorrhage (CT head CSpine and XR). She was found to be having episodes of afib w/ RVR. Unclear if she is compliant with medications. She was admitted to telemetry and with AVN blocking agents she converted to sinus rhythm. Patient seen by cardiology who recommended AC, rate control and OPP follow up with her cardiologist at [facility]. She had TTE which was grossly normal. Seen by PT who recommended home PT. Will simplify patient's home regimen by discontinuing diuretic and losartan. It was emphasized that she needs to follow up outpatient with PCP for blood pressure check.      Important Medication Changes and Reason: Stopped Losartan & HCTZ    Active or Pending Issues Requiring Follow-up: Cardiology    Advanced Directives:   [x] Full code  [ ] DNR  [ ] Hospice

## 2023-10-13 NOTE — DISCHARGE NOTE PROVIDER - NSDCMRMEDTOKEN_GEN_ALL_CORE_FT
Eliquis 2.5 mg oral tablet: 1 tab(s) orally 2 times a day  hydroCHLOROthiazide 12.5 mg oral tablet: 1 tab(s) orally once a day  latanoprost 0.005% ophthalmic solution: 1 drop(s) in each eye once a day (at bedtime) BOTH EYES  Lipitor 20 mg oral tablet: 1 tab(s) orally once a day (at bedtime)  losartan 25 mg oral tablet: 1 tab(s) orally once a day  metoprolol tartrate 25 mg oral tablet: 1 tab(s) orally 2 times a day  Synthroid 50 mcg (0.05 mg) oral tablet: 1 tab(s) orally once a day   Eliquis 2.5 mg oral tablet: 1 tab(s) orally 2 times a day  latanoprost 0.005% ophthalmic solution: 1 drop(s) in each eye once a day (at bedtime) BOTH EYES  Lipitor 20 mg oral tablet: 1 tab(s) orally once a day (at bedtime)  metoprolol tartrate 25 mg oral tablet: 1 tab(s) orally 2 times a day  Synthroid 50 mcg (0.05 mg) oral tablet: 1 tab(s) orally once a day   Eliquis 2.5 mg oral tablet: 1 tab(s) orally 2 times a day  Home Physical Therapy: 2-3x week  latanoprost 0.005% ophthalmic solution: 1 drop(s) in each eye once a day (at bedtime) BOTH EYES  Lipitor 20 mg oral tablet: 1 tab(s) orally once a day (at bedtime)  metoprolol tartrate 25 mg oral tablet: 1 tab(s) orally 2 times a day  Synthroid 50 mcg (0.05 mg) oral tablet: 1 tab(s) orally once a day

## 2023-10-13 NOTE — CHART NOTE - NSCHARTNOTEFT_GEN_A_CORE
Request from Dr. Khoury to facilitate patient discharge.  Medication reconciliation reviewed, revised, and resolved with Dr. Khoury, who has medically cleared patient for discharge with follow up as advised.  Please refer to discharge note for detailed hospital course.

## 2023-10-13 NOTE — DISCHARGE NOTE PROVIDER - CARE PROVIDER_API CALL
Jaycee Martines  Internal Medicine  [address] [place] [address]  Phone: [phone]  Fax: [phone]  Established Patient  Follow Up Time: 2 weeks   Jaycee Martines  Internal Medicine  [address] [place] [address]  Phone: [phone]  Fax: [phone]  Established Patient  Follow Up Time: 2 weeks   Jaycee Martines  Internal Medicine  [address] [place] [address]  Phone: [phone]  Fax: [phone]  Established Patient  Follow Up Time: 2 weeks

## 2023-10-13 NOTE — DISCHARGE NOTE PROVIDER - NSDCCPCAREPLAN_GEN_ALL_CORE_FT
PRINCIPAL DISCHARGE DIAGNOSIS  Diagnosis: Paroxysmal atrial fibrillation with rapid ventricular response  Assessment and Plan of Treatment: Self convrted to a normal rhythm. TTE without gross abnormality. Please continue to take Eliuqis and Metoprolol and follow up outpatient with your cardiologist.      SECONDARY DISCHARGE DIAGNOSES  Diagnosis: Essential hypertension  Assessment and Plan of Treatment: You may stop hydrochlorothiazide & losartan. Follow up outpatient    Diagnosis: Hypothyroidism  Assessment and Plan of Treatment: Continue with levothyroxine    Diagnosis: Fall  Assessment and Plan of Treatment: You were seen by physical therapy and they recommended home PT.     PRINCIPAL DISCHARGE DIAGNOSIS  Diagnosis: Paroxysmal atrial fibrillation with rapid ventricular response  Assessment and Plan of Treatment: Self convrted to a normal rhythm. TTE without gross abnormality. Please continue to take Eliquis and Metoprolol and follow up outpatient with your cardiologist.      SECONDARY DISCHARGE DIAGNOSES  Diagnosis: Fall  Assessment and Plan of Treatment: You were seen by physical therapy and they recommended home PT.    Diagnosis: Essential hypertension  Assessment and Plan of Treatment: You may stop hydrochlorothiazide & losartan. Follow up outpatient    Diagnosis: Hypothyroidism  Assessment and Plan of Treatment: Continue with levothyroxine

## 2023-10-13 NOTE — DISCHARGE NOTE NURSING/CASE MANAGEMENT/SOCIAL WORK - NSDCFUADDAPPT_GEN_ALL_CORE_FT
Please follow up with your cardiologist at [facility] within one week of discharge - please call to make an appointment.    Please follow up with your primary medical doctor within one week of discharge - please call to make an appointment.          APPTS ARE READY TO BE MADE: [ X] YES    Best Family or Patient Contact (if needed):    Additional Information about above appointments (if needed):    1: Cardiologist  2: Primary medical doctor  3:     Other comments or requests:    Please follow up with your cardiologist at [facility] within one week of discharge - please call to make an appointment.    Please follow up with your primary medical doctor within one week of discharge - please call to make an appointment.          APPTS ARE READY TO BE MADE: [ X] YES    Best Family or Patient Contact (if needed):    Additional Information about above appointments (if needed):    1: Cardiologist  2: Primary medical doctor  3:     Other comments or requests:    Please follow up with your cardiologist at [facility] within one week of discharge - please call to make an appointment.    Please follow up with your primary medical doctor within one week of discharge - please call to make an appointment.          APPTS ARE READY TO BE MADE: [ X] YES    Best Family or Patient Contact (if needed):    Additional Information about above appointments (if needed):    1: Cardiologist  2: Primary medical doctor  3:     Other comments or requests:

## 2023-10-13 NOTE — PROGRESS NOTE ADULT - SUBJECTIVE AND OBJECTIVE BOX
INTERVAL EVENTS/SUBJ:  TTE complete     Home Medications:  Eliquis 2.5 mg oral tablet: 1 tab(s) orally 2 times a day (11 Oct 2023 22:55)  hydroCHLOROthiazide 12.5 mg oral tablet: 1 tab(s) orally once a day (11 Oct 2023 22:55)  latanoprost 0.005% ophthalmic solution: 1 drop(s) in each eye once a day (at bedtime) BOTH EYES (11 Oct 2023 22:56)  Lipitor 20 mg oral tablet: 1 tab(s) orally once a day (at bedtime) (11 Oct 2023 22:56)  losartan 25 mg oral tablet: 1 tab(s) orally once a day (11 Oct 2023 22:56)  metoprolol tartrate 25 mg oral tablet: 1 tab(s) orally 2 times a day (11 Oct 2023 22:58)  Synthroid 50 mcg (0.05 mg) oral tablet: 1 tab(s) orally once a day (11 Oct 2023 22:57)      MEDICATIONS  (STANDING):  apixaban 2.5 milliGRAM(s) Oral two times a day  atorvastatin 20 milliGRAM(s) Oral at bedtime  latanoprost 0.005% Ophthalmic Solution 1 Drop(s) Both EYES <User Schedule>  levothyroxine 50 MICROGram(s) Oral daily  metoprolol tartrate 25 milliGRAM(s) Oral two times a day    MEDICATIONS  (PRN):  acetaminophen     Tablet .. 650 milliGRAM(s) Oral every 6 hours PRN Temp greater or equal to 38C (100.4F), Mild Pain (1 - 3)      Vital Signs Last 24 Hrs  T(C): 36.7 (13 Oct 2023 04:00), Max: 36.7 (13 Oct 2023 04:00)  T(F): 98.1 (13 Oct 2023 04:00), Max: 98.1 (13 Oct 2023 04:00)  HR: 77 (13 Oct 2023 04:00) (74 - 80)  BP: 121/82 (13 Oct 2023 04:00) (110/73 - 143/85)  BP(mean): --  RR: 17 (13 Oct 2023 04:00) (17 - 18)  SpO2: 98% (13 Oct 2023 04:00) (98% - 99%)    Parameters below as of 13 Oct 2023 04:00  Patient On (Oxygen Delivery Method): room air        REVIEW OF SYSTEMS:  As per HPI, otherwise unremarkable.     PHYSICAL EXAM:  Constitutional/Appearance: Normal, Well-developed  HEENT:   Normal oral mucosa, no drainage or redness, supple neck  Lymphatic: No lymphadenopathy  Cardiovascular: Normal S1 S2, No edema, II/VI BASILIO  Respiratory: Lungs clear to auscultation, respirations non-labored  Psychiatry: A & O x 3, appropriate affect.   Gastrointestinal:  Soft, Non-tender, no distention  Skin: No rashes, No ecchymoses, No cyanosis	  Neurologic: Non-focal, Alert and oriented x 3  Extremities: Normal range of motion  Vascular: Peripheral pulses palpable 2+ bilaterally (radial)    LABS:  CBC Full  -  ( 12 Oct 2023 06:56 )  WBC Count : 6.27 K/uL  RBC Count : 4.23 M/uL  Hemoglobin : 13.5 g/dL  Hematocrit : 40.6 %  Platelet Count - Automated : 222 K/uL  Mean Cell Volume : 96.0 fl  Mean Cell Hemoglobin : 31.9 pg  Mean Cell Hemoglobin Concentration : 33.3 gm/dL  Auto Neutrophil # : 3.12 K/uL  Auto Lymphocyte # : 2.31 K/uL  Auto Monocyte # : 0.72 K/uL  Auto Eosinophil # : 0.07 K/uL  Auto Basophil # : 0.02 K/uL  Auto Neutrophil % : 49.8 %  Auto Lymphocyte % : 36.8 %  Auto Monocyte % : 11.5 %  Auto Eosinophil % : 1.1 %  Auto Basophil % : 0.3 %      10-12    141  |  105  |  19  ----------------------------<  123<H>  3.8   |  24  |  0.92    Ca    9.4      12 Oct 2023 06:56  Mg     1.8     10-12    TPro  8.0  /  Alb  4.4  /  TBili  0.5  /  DBili  x   /  AST  36  /  ALT  30  /  AlkPhos  54  10-11      TTE   1. Left ventricular systolic function is normal with an ejection fraction of 61 % by Daniel's method of disks.   2. Normal right ventricular cavity size, wall thickness, and systolic function.   3. Normal atria.   4. No significant valvular disease.   5. No pericardial effusion seen.      IMPRESSION AND PLAN: 88F w pAF on eliquis, HTN, hypothyroid here after fall. AF RVR with aberrant conduction and NSVT, now converted to NSR.  -tele  -rpt 12 lead ecg  -cont metoprolol tartrate 25 bid  -cont eliquis  -TTE EF 61  -stable for dc w cardiology follow-up for rhythm monitor       ***    Lei Liang MD, MPhil, [facility]  Cardiologist, [facility]  ; [employer] at [facility]  email: [email or web address] Cardiology and Cardiovascular Surgery on-service contact/call information, go to [email or web address] and use "[employer]" to login.  Outpatient Cardiology appointments, call  [phone] to arrange with a colleague; I do not have outpatient Cardiology clinic.    INTERVAL EVENTS/SUBJ:  TTE complete     Home Medications:  Eliquis 2.5 mg oral tablet: 1 tab(s) orally 2 times a day (11 Oct 2023 22:55)  hydroCHLOROthiazide 12.5 mg oral tablet: 1 tab(s) orally once a day (11 Oct 2023 22:55)  latanoprost 0.005% ophthalmic solution: 1 drop(s) in each eye once a day (at bedtime) BOTH EYES (11 Oct 2023 22:56)  Lipitor 20 mg oral tablet: 1 tab(s) orally once a day (at bedtime) (11 Oct 2023 22:56)  losartan 25 mg oral tablet: 1 tab(s) orally once a day (11 Oct 2023 22:56)  metoprolol tartrate 25 mg oral tablet: 1 tab(s) orally 2 times a day (11 Oct 2023 22:58)  Synthroid 50 mcg (0.05 mg) oral tablet: 1 tab(s) orally once a day (11 Oct 2023 22:57)      MEDICATIONS  (STANDING):  apixaban 2.5 milliGRAM(s) Oral two times a day  atorvastatin 20 milliGRAM(s) Oral at bedtime  latanoprost 0.005% Ophthalmic Solution 1 Drop(s) Both EYES <User Schedule>  levothyroxine 50 MICROGram(s) Oral daily  metoprolol tartrate 25 milliGRAM(s) Oral two times a day    MEDICATIONS  (PRN):  acetaminophen     Tablet .. 650 milliGRAM(s) Oral every 6 hours PRN Temp greater or equal to 38C (100.4F), Mild Pain (1 - 3)      Vital Signs Last 24 Hrs  T(C): 36.7 (13 Oct 2023 04:00), Max: 36.7 (13 Oct 2023 04:00)  T(F): 98.1 (13 Oct 2023 04:00), Max: 98.1 (13 Oct 2023 04:00)  HR: 77 (13 Oct 2023 04:00) (74 - 80)  BP: 121/82 (13 Oct 2023 04:00) (110/73 - 143/85)  BP(mean): --  RR: 17 (13 Oct 2023 04:00) (17 - 18)  SpO2: 98% (13 Oct 2023 04:00) (98% - 99%)    Parameters below as of 13 Oct 2023 04:00  Patient On (Oxygen Delivery Method): room air        REVIEW OF SYSTEMS:  As per HPI, otherwise unremarkable.     PHYSICAL EXAM:  Constitutional/Appearance: Normal, Well-developed  HEENT:   Normal oral mucosa, no drainage or redness, supple neck  Lymphatic: No lymphadenopathy  Cardiovascular: Normal S1 S2, No edema, II/VI BASILIO  Respiratory: Lungs clear to auscultation, respirations non-labored  Psychiatry: A & O x 3, appropriate affect.   Gastrointestinal:  Soft, Non-tender, no distention  Skin: No rashes, No ecchymoses, No cyanosis	  Neurologic: Non-focal, Alert and oriented x 3  Extremities: Normal range of motion  Vascular: Peripheral pulses palpable 2+ bilaterally (radial)    LABS:  CBC Full  -  ( 12 Oct 2023 06:56 )  WBC Count : 6.27 K/uL  RBC Count : 4.23 M/uL  Hemoglobin : 13.5 g/dL  Hematocrit : 40.6 %  Platelet Count - Automated : 222 K/uL  Mean Cell Volume : 96.0 fl  Mean Cell Hemoglobin : 31.9 pg  Mean Cell Hemoglobin Concentration : 33.3 gm/dL  Auto Neutrophil # : 3.12 K/uL  Auto Lymphocyte # : 2.31 K/uL  Auto Monocyte # : 0.72 K/uL  Auto Eosinophil # : 0.07 K/uL  Auto Basophil # : 0.02 K/uL  Auto Neutrophil % : 49.8 %  Auto Lymphocyte % : 36.8 %  Auto Monocyte % : 11.5 %  Auto Eosinophil % : 1.1 %  Auto Basophil % : 0.3 %      10-12    141  |  105  |  19  ----------------------------<  123<H>  3.8   |  24  |  0.92    Ca    9.4      12 Oct 2023 06:56  Mg     1.8     10-12    TPro  8.0  /  Alb  4.4  /  TBili  0.5  /  DBili  x   /  AST  36  /  ALT  30  /  AlkPhos  54  10-11      TTE   1. Left ventricular systolic function is normal with an ejection fraction of 61 % by Daniel's method of disks.   2. Normal right ventricular cavity size, wall thickness, and systolic function.   3. Normal atria.   4. No significant valvular disease.   5. No pericardial effusion seen.      IMPRESSION AND PLAN: 88F w pAF on eliquis, HTN, hypothyroid here after fall. AF RVR with aberrant conduction and NSVT, now converted to NSR.  -tele  -rpt 12 lead ecg  -cont metoprolol tartrate 25 bid  -cont eliquis  -TTE EF 61  -stable for dc w cardiology follow-up for rhythm monitor       ***    Lei Liang MD, MPhil, [facility]  Cardiologist, [facility]  ; [employer] at [facility]  email: [email or web address] Cardiology and Cardiovascular Surgery on-service contact/call information, go to [email or web address] and use "[email or web address]" to login.  Outpatient Cardiology appointments, call  [phone] to arrange with a colleague; I do not have outpatient Cardiology clinic.    INTERVAL EVENTS/SUBJ:  TTE complete     Home Medications:  Eliquis 2.5 mg oral tablet: 1 tab(s) orally 2 times a day (11 Oct 2023 22:55)  hydroCHLOROthiazide 12.5 mg oral tablet: 1 tab(s) orally once a day (11 Oct 2023 22:55)  latanoprost 0.005% ophthalmic solution: 1 drop(s) in each eye once a day (at bedtime) BOTH EYES (11 Oct 2023 22:56)  Lipitor 20 mg oral tablet: 1 tab(s) orally once a day (at bedtime) (11 Oct 2023 22:56)  losartan 25 mg oral tablet: 1 tab(s) orally once a day (11 Oct 2023 22:56)  metoprolol tartrate 25 mg oral tablet: 1 tab(s) orally 2 times a day (11 Oct 2023 22:58)  Synthroid 50 mcg (0.05 mg) oral tablet: 1 tab(s) orally once a day (11 Oct 2023 22:57)      MEDICATIONS  (STANDING):  apixaban 2.5 milliGRAM(s) Oral two times a day  atorvastatin 20 milliGRAM(s) Oral at bedtime  latanoprost 0.005% Ophthalmic Solution 1 Drop(s) Both EYES <User Schedule>  levothyroxine 50 MICROGram(s) Oral daily  metoprolol tartrate 25 milliGRAM(s) Oral two times a day    MEDICATIONS  (PRN):  acetaminophen     Tablet .. 650 milliGRAM(s) Oral every 6 hours PRN Temp greater or equal to 38C (100.4F), Mild Pain (1 - 3)      Vital Signs Last 24 Hrs  T(C): 36.7 (13 Oct 2023 04:00), Max: 36.7 (13 Oct 2023 04:00)  T(F): 98.1 (13 Oct 2023 04:00), Max: 98.1 (13 Oct 2023 04:00)  HR: 77 (13 Oct 2023 04:00) (74 - 80)  BP: 121/82 (13 Oct 2023 04:00) (110/73 - 143/85)  BP(mean): --  RR: 17 (13 Oct 2023 04:00) (17 - 18)  SpO2: 98% (13 Oct 2023 04:00) (98% - 99%)    Parameters below as of 13 Oct 2023 04:00  Patient On (Oxygen Delivery Method): room air        REVIEW OF SYSTEMS:  As per HPI, otherwise unremarkable.     PHYSICAL EXAM:  Constitutional/Appearance: Normal, Well-developed  HEENT:   Normal oral mucosa, no drainage or redness, supple neck  Lymphatic: No lymphadenopathy  Cardiovascular: Normal S1 S2, No edema, II/VI BASILIO  Respiratory: Lungs clear to auscultation, respirations non-labored  Psychiatry: A & O x 3, appropriate affect.   Gastrointestinal:  Soft, Non-tender, no distention  Skin: No rashes, No ecchymoses, No cyanosis	  Neurologic: Non-focal, Alert and oriented x 3  Extremities: Normal range of motion  Vascular: Peripheral pulses palpable 2+ bilaterally (radial)    LABS:  CBC Full  -  ( 12 Oct 2023 06:56 )  WBC Count : 6.27 K/uL  RBC Count : 4.23 M/uL  Hemoglobin : 13.5 g/dL  Hematocrit : 40.6 %  Platelet Count - Automated : 222 K/uL  Mean Cell Volume : 96.0 fl  Mean Cell Hemoglobin : 31.9 pg  Mean Cell Hemoglobin Concentration : 33.3 gm/dL  Auto Neutrophil # : 3.12 K/uL  Auto Lymphocyte # : 2.31 K/uL  Auto Monocyte # : 0.72 K/uL  Auto Eosinophil # : 0.07 K/uL  Auto Basophil # : 0.02 K/uL  Auto Neutrophil % : 49.8 %  Auto Lymphocyte % : 36.8 %  Auto Monocyte % : 11.5 %  Auto Eosinophil % : 1.1 %  Auto Basophil % : 0.3 %      10-12    141  |  105  |  19  ----------------------------<  123<H>  3.8   |  24  |  0.92    Ca    9.4      12 Oct 2023 06:56  Mg     1.8     10-12    TPro  8.0  /  Alb  4.4  /  TBili  0.5  /  DBili  x   /  AST  36  /  ALT  30  /  AlkPhos  54  10-11      TTE   1. Left ventricular systolic function is normal with an ejection fraction of 61 % by Daniel's method of disks.   2. Normal right ventricular cavity size, wall thickness, and systolic function.   3. Normal atria.   4. No significant valvular disease.   5. No pericardial effusion seen.      IMPRESSION AND PLAN: 88F w pAF on eliquis, HTN, hypothyroid here after fall. AF RVR with aberrant conduction and NSVT, now converted to NSR.  -tele  -rpt 12 lead ecg  -cont metoprolol tartrate 25 bid  -cont eliquis  -TTE EF 61  -stable for dc w cardiology follow-up for rhythm monitor       ***    Lei Liang MD, MPhil, [facility]  Cardiologist, [facility]  ; [employer] [facility] [facility] [employer] at [facility]  email: [email or web address] Cardiology and Cardiovascular Surgery on-service contact/call information, go to [email or web address] and use "[employer]" to login.  Outpatient Cardiology appointments, call  [phone] to arrange with a colleague; I do not have outpatient Cardiology clinic.

## 2023-10-13 NOTE — PATIENT PROFILE ADULT - NSPROIMPLANTSMEDDEV_GEN_A_NUR
Discharged to home/self care.    - Condition at discharge: Good  - Mode of Discharge: Ambulatory  - The patient left the ED accompanied by a family member.  - The discharge instructions were discussed with the patient.  - They state an understanding of the discharge instructions.  - Walked pt to the discharge station.  
Patient placed on continuous cardiac monitor, automatic blood pressure cuff and continuous pulse oximeter.  
The patient is awake, alert. Airway is open and patent, respirations are spontaneous, normal respiratory effort and rate noted, full ROM in all extremities, resting comfortably. No change from previous assessment. Bed in low, locked position. Pt able to change position independently. Will continue to monitor.  
None

## 2023-10-13 NOTE — DISCHARGE NOTE NURSING/CASE MANAGEMENT/SOCIAL WORK - NSDCPEFALRISK_GEN_ALL_CORE
For information on Fall & Injury Prevention, visit: [email or web address].[email or web address]/news/fall-prevention-protects-and-maintains-health-and-mobility OR  [email or web address]://www.[facility]/news/fall-prevention-tips-to-avoid-injury OR  https://www.cdc.gov/steadi/patient.html For information on Fall & Injury Prevention, visit: https://www.[facility]/news/fall-prevention-protects-and-maintains-health-and-mobility OR  https://www.[facility]/news/fall-prevention-tips-to-avoid-injury OR  https://www.cdc.gov/steadi/patient.html For information on Fall & Injury Prevention, visit: https://www.[facility]/news/fall-prevention-protects-and-maintains-health-and-mobility OR  https://www.[facility]/news/fall-prevention-tips-to-avoid-injury OR  https://www.cdc.gov/steadi/patient.html

## 2023-10-13 NOTE — DISCHARGE NOTE PROVIDER - NSDCFUADDAPPT_GEN_ALL_CORE_FT
Please follow up with your cardiologist at [facility] within two weeks Please follow up with your cardiologist at [facility] within two weeks Please follow up with your cardiologist at [facility] within two weeks Please follow up with your cardiologist at [facility] within one week of discharge - please call to make an appointment.    Please follow up with your primary medical doctor within one week of discharge - please call to make an appointment.          APPTS ARE READY TO BE MADE: [ X] YES    Best Family or Patient Contact (if needed):    Additional Information about above appointments (if needed):    1: Cardiologist  2: Primary medical doctor  3:     Other comments or requests:    Please follow up with your cardiologist at [facility] within one week of discharge - please call to make an appointment.    Please follow up with your primary medical doctor within one week of discharge - please call to make an appointment.          APPTS ARE READY TO BE MADE: [ X] YES    Best Family or Patient Contact (if needed):    Additional Information about above appointments (if needed):    1: Cardiologist  2: Primary medical doctor  3:     Other comments or requests:    Please follow up with your cardiologist at [facility] within one week of discharge - please call to make an appointment.    Please follow up with your primary medical doctor within one week of discharge - please call to make an appointment.          APPTS ARE READY TO BE MADE: [ X] YES    Best Family or Patient Contact (if needed):    Additional Information about above appointments (if needed):    1: Cardiologist  2: Primary medical doctor  3:     Other comments or requests:    Please follow up with your cardiologist at [facility] within one week of discharge - please call to make an appointment.    Please follow up with your primary medical doctor within one week of discharge - please call to make an appointment.          APPTS ARE READY TO BE MADE: [ X] YES    Best Family or Patient Contact (if needed):    Additional Information about above appointments (if needed):    1: Cardiologist  2: Primary medical doctor  3:     Other comments or requests:   Pt is torrey with Dr. Martines on 10/18 at 1:30pm, [address] [facility] location.   Please follow up with your cardiologist at [facility] within one week of discharge - please call to make an appointment.    Please follow up with your primary medical doctor within one week of discharge - please call to make an appointment.          APPTS ARE READY TO BE MADE: [ X] YES    Best Family or Patient Contact (if needed):    Additional Information about above appointments (if needed):    1: Cardiologist  2: Primary medical doctor  3:     Other comments or requests:   Pt is torrey with Dr. Martines on 10/18 at 1:30pm, [address] [facility] [place] location.   Please follow up with your cardiologist at [facility] within one week of discharge - please call to make an appointment.    Please follow up with your primary medical doctor within one week of discharge - please call to make an appointment.          APPTS ARE READY TO BE MADE: [ X] YES    Best Family or Patient Contact (if needed):    Additional Information about above appointments (if needed):    1: Cardiologist  2: Primary medical doctor  3:     Other comments or requests:   Pt is torrey with Dr. Martines on 10/18 at 1:30pm, [address] [facility] location.

## 2023-11-28 ENCOUNTER — EMERGENCY (EMERGENCY)
Facility: HOSPITAL | Age: 88
LOS: 1 days | Discharge: HOME | End: 2023-11-28
Attending: EMERGENCY MEDICINE
Payer: MEDICARE

## 2023-11-28 VITALS
OXYGEN SATURATION: 98 % | WEIGHT: 141.98 LBS | DIASTOLIC BLOOD PRESSURE: 84 MMHG | TEMPERATURE: 98 F | SYSTOLIC BLOOD PRESSURE: 149 MMHG | HEART RATE: 77 BPM | RESPIRATION RATE: 16 BRPM | HEIGHT: 63 IN

## 2023-11-28 VITALS
RESPIRATION RATE: 18 BRPM | OXYGEN SATURATION: 99 % | HEART RATE: 78 BPM | DIASTOLIC BLOOD PRESSURE: 88 MMHG | SYSTOLIC BLOOD PRESSURE: 159 MMHG | TEMPERATURE: 98 F

## 2023-11-28 DIAGNOSIS — Z96.659 PRESENCE OF UNSPECIFIED ARTIFICIAL KNEE JOINT: Chronic | ICD-10-CM

## 2023-11-28 LAB
ALBUMIN SERPL ELPH-MCNC: 4 G/DL — SIGNIFICANT CHANGE UP (ref 3.3–5)
ALP SERPL-CCNC: 43 U/L — SIGNIFICANT CHANGE UP (ref 40–120)
ALT FLD-CCNC: 22 U/L — SIGNIFICANT CHANGE UP (ref 10–45)
ANION GAP SERPL CALC-SCNC: 12 MMOL/L — SIGNIFICANT CHANGE UP (ref 5–17)
APPEARANCE UR: CLEAR — SIGNIFICANT CHANGE UP
APTT BLD: 34.6 SEC — SIGNIFICANT CHANGE UP (ref 24.5–35.6)
AST SERPL-CCNC: 34 U/L — SIGNIFICANT CHANGE UP (ref 10–40)
BACTERIA # UR AUTO: NEGATIVE /HPF — SIGNIFICANT CHANGE UP
BASE EXCESS BLDV CALC-SCNC: 1.6 MMOL/L — SIGNIFICANT CHANGE UP (ref -2–3)
BASOPHILS # BLD AUTO: 0.03 K/UL — SIGNIFICANT CHANGE UP (ref 0–0.2)
BASOPHILS NFR BLD AUTO: 0.5 % — SIGNIFICANT CHANGE UP (ref 0–2)
BILIRUB SERPL-MCNC: 0.7 MG/DL — SIGNIFICANT CHANGE UP (ref 0.2–1.2)
BILIRUB UR-MCNC: NEGATIVE — SIGNIFICANT CHANGE UP
BUN SERPL-MCNC: 13 MG/DL — SIGNIFICANT CHANGE UP (ref 7–23)
CA-I SERPL-SCNC: 1.24 MMOL/L — SIGNIFICANT CHANGE UP (ref 1.15–1.33)
CALCIUM SERPL-MCNC: 9.6 MG/DL — SIGNIFICANT CHANGE UP (ref 8.4–10.5)
CAST: 0 /LPF — SIGNIFICANT CHANGE UP (ref 0–4)
CHLORIDE BLDV-SCNC: 101 MMOL/L — SIGNIFICANT CHANGE UP (ref 96–108)
CHLORIDE SERPL-SCNC: 103 MMOL/L — SIGNIFICANT CHANGE UP (ref 96–108)
CO2 BLDV-SCNC: 29 MMOL/L — HIGH (ref 22–26)
CO2 SERPL-SCNC: 24 MMOL/L — SIGNIFICANT CHANGE UP (ref 22–31)
COLOR SPEC: YELLOW — SIGNIFICANT CHANGE UP
CREAT SERPL-MCNC: 0.79 MG/DL — SIGNIFICANT CHANGE UP (ref 0.5–1.3)
DIFF PNL FLD: ABNORMAL
EGFR: 73 ML/MIN/1.73M2 — SIGNIFICANT CHANGE UP
EOSINOPHIL # BLD AUTO: 0.04 K/UL — SIGNIFICANT CHANGE UP (ref 0–0.5)
EOSINOPHIL NFR BLD AUTO: 0.7 % — SIGNIFICANT CHANGE UP (ref 0–6)
GAS PNL BLDV: 135 MMOL/L — LOW (ref 136–145)
GAS PNL BLDV: SIGNIFICANT CHANGE UP
GLUCOSE BLDV-MCNC: 88 MG/DL — SIGNIFICANT CHANGE UP (ref 70–99)
GLUCOSE SERPL-MCNC: 89 MG/DL — SIGNIFICANT CHANGE UP (ref 70–99)
GLUCOSE UR QL: NEGATIVE MG/DL — SIGNIFICANT CHANGE UP
HCO3 BLDV-SCNC: 28 MMOL/L — SIGNIFICANT CHANGE UP (ref 22–29)
HCT VFR BLD CALC: 41.2 % — SIGNIFICANT CHANGE UP (ref 34.5–45)
HCT VFR BLDA CALC: 43 % — SIGNIFICANT CHANGE UP (ref 34.5–46.5)
HGB BLD CALC-MCNC: 14.3 G/DL — SIGNIFICANT CHANGE UP (ref 11.7–16.1)
HGB BLD-MCNC: 13.7 G/DL — SIGNIFICANT CHANGE UP (ref 11.5–15.5)
IMM GRANULOCYTES NFR BLD AUTO: 0.3 % — SIGNIFICANT CHANGE UP (ref 0–0.9)
INR BLD: 1.13 RATIO — SIGNIFICANT CHANGE UP (ref 0.85–1.18)
KETONES UR-MCNC: NEGATIVE MG/DL — SIGNIFICANT CHANGE UP
LACTATE BLDV-MCNC: 1.3 MMOL/L — SIGNIFICANT CHANGE UP (ref 0.5–2)
LEUKOCYTE ESTERASE UR-ACNC: NEGATIVE — SIGNIFICANT CHANGE UP
LYMPHOCYTES # BLD AUTO: 2.05 K/UL — SIGNIFICANT CHANGE UP (ref 1–3.3)
LYMPHOCYTES # BLD AUTO: 35.5 % — SIGNIFICANT CHANGE UP (ref 13–44)
MCHC RBC-ENTMCNC: 31.9 PG — SIGNIFICANT CHANGE UP (ref 27–34)
MCHC RBC-ENTMCNC: 33.3 GM/DL — SIGNIFICANT CHANGE UP (ref 32–36)
MCV RBC AUTO: 96 FL — SIGNIFICANT CHANGE UP (ref 80–100)
MONOCYTES # BLD AUTO: 0.56 K/UL — SIGNIFICANT CHANGE UP (ref 0–0.9)
MONOCYTES NFR BLD AUTO: 9.7 % — SIGNIFICANT CHANGE UP (ref 2–14)
NEUTROPHILS # BLD AUTO: 3.07 K/UL — SIGNIFICANT CHANGE UP (ref 1.8–7.4)
NEUTROPHILS NFR BLD AUTO: 53.3 % — SIGNIFICANT CHANGE UP (ref 43–77)
NITRITE UR-MCNC: NEGATIVE — SIGNIFICANT CHANGE UP
NRBC # BLD: 0 /100 WBCS — SIGNIFICANT CHANGE UP (ref 0–0)
PCO2 BLDV: 48 MMHG — HIGH (ref 39–42)
PH BLDV: 7.37 — SIGNIFICANT CHANGE UP (ref 7.32–7.43)
PH UR: 5.5 — SIGNIFICANT CHANGE UP (ref 5–8)
PLATELET # BLD AUTO: 193 K/UL — SIGNIFICANT CHANGE UP (ref 150–400)
PO2 BLDV: 38 MMHG — SIGNIFICANT CHANGE UP (ref 25–45)
POTASSIUM BLDV-SCNC: 3.7 MMOL/L — SIGNIFICANT CHANGE UP (ref 3.5–5.1)
POTASSIUM SERPL-MCNC: 3.7 MMOL/L — SIGNIFICANT CHANGE UP (ref 3.5–5.3)
POTASSIUM SERPL-SCNC: 3.7 MMOL/L — SIGNIFICANT CHANGE UP (ref 3.5–5.3)
PROT SERPL-MCNC: 7.3 G/DL — SIGNIFICANT CHANGE UP (ref 6–8.3)
PROT UR-MCNC: NEGATIVE MG/DL — SIGNIFICANT CHANGE UP
PROTHROM AB SERPL-ACNC: 11.8 SEC — SIGNIFICANT CHANGE UP (ref 9.5–13)
RBC # BLD: 4.29 M/UL — SIGNIFICANT CHANGE UP (ref 3.8–5.2)
RBC # FLD: 12.1 % — SIGNIFICANT CHANGE UP (ref 10.3–14.5)
RBC CASTS # UR COMP ASSIST: 4 /HPF — SIGNIFICANT CHANGE UP (ref 0–4)
REVIEW: SIGNIFICANT CHANGE UP
SAO2 % BLDV: 51 % — LOW (ref 67–88)
SODIUM SERPL-SCNC: 139 MMOL/L — SIGNIFICANT CHANGE UP (ref 135–145)
SP GR SPEC: 1.01 — SIGNIFICANT CHANGE UP (ref 1–1.03)
SQUAMOUS # UR AUTO: 1 /HPF — SIGNIFICANT CHANGE UP (ref 0–5)
TROPONIN T, HIGH SENSITIVITY RESULT: 8 NG/L — SIGNIFICANT CHANGE UP (ref 0–51)
UROBILINOGEN FLD QL: 0.2 MG/DL — SIGNIFICANT CHANGE UP (ref 0.2–1)
WBC # BLD: 5.77 K/UL — SIGNIFICANT CHANGE UP (ref 3.8–10.5)
WBC # FLD AUTO: 5.77 K/UL — SIGNIFICANT CHANGE UP (ref 3.8–10.5)
WBC UR QL: 1 /HPF — SIGNIFICANT CHANGE UP (ref 0–5)

## 2023-11-28 PROCEDURE — 84295 ASSAY OF SERUM SODIUM: CPT

## 2023-11-28 PROCEDURE — 74177 CT ABD & PELVIS W/CONTRAST: CPT | Mod: MA

## 2023-11-28 PROCEDURE — 99285 EMERGENCY DEPT VISIT HI MDM: CPT

## 2023-11-28 PROCEDURE — 86900 BLOOD TYPING SEROLOGIC ABO: CPT

## 2023-11-28 PROCEDURE — 84484 ASSAY OF TROPONIN QUANT: CPT

## 2023-11-28 PROCEDURE — 70450 CT HEAD/BRAIN W/O DYE: CPT | Mod: 26,MA

## 2023-11-28 PROCEDURE — 74177 CT ABD & PELVIS W/CONTRAST: CPT | Mod: 26,MA

## 2023-11-28 PROCEDURE — 85730 THROMBOPLASTIN TIME PARTIAL: CPT

## 2023-11-28 PROCEDURE — 82947 ASSAY GLUCOSE BLOOD QUANT: CPT

## 2023-11-28 PROCEDURE — 83880 ASSAY OF NATRIURETIC PEPTIDE: CPT

## 2023-11-28 PROCEDURE — 36415 COLL VENOUS BLD VENIPUNCTURE: CPT

## 2023-11-28 PROCEDURE — 85610 PROTHROMBIN TIME: CPT

## 2023-11-28 PROCEDURE — 80053 COMPREHEN METABOLIC PANEL: CPT

## 2023-11-28 PROCEDURE — 82435 ASSAY OF BLOOD CHLORIDE: CPT

## 2023-11-28 PROCEDURE — 72125 CT NECK SPINE W/O DYE: CPT | Mod: 26,MA

## 2023-11-28 PROCEDURE — 86901 BLOOD TYPING SEROLOGIC RH(D): CPT

## 2023-11-28 PROCEDURE — 99285 EMERGENCY DEPT VISIT HI MDM: CPT | Mod: 25

## 2023-11-28 PROCEDURE — 96374 THER/PROPH/DIAG INJ IV PUSH: CPT | Mod: XU

## 2023-11-28 PROCEDURE — 72125 CT NECK SPINE W/O DYE: CPT | Mod: MA

## 2023-11-28 PROCEDURE — 71260 CT THORAX DX C+: CPT | Mod: MA

## 2023-11-28 PROCEDURE — 81001 URINALYSIS AUTO W/SCOPE: CPT

## 2023-11-28 PROCEDURE — 82803 BLOOD GASES ANY COMBINATION: CPT

## 2023-11-28 PROCEDURE — 85025 COMPLETE CBC W/AUTO DIFF WBC: CPT

## 2023-11-28 PROCEDURE — 70450 CT HEAD/BRAIN W/O DYE: CPT | Mod: MA

## 2023-11-28 PROCEDURE — 84132 ASSAY OF SERUM POTASSIUM: CPT

## 2023-11-28 PROCEDURE — 85014 HEMATOCRIT: CPT

## 2023-11-28 PROCEDURE — 85018 HEMOGLOBIN: CPT

## 2023-11-28 PROCEDURE — 71260 CT THORAX DX C+: CPT | Mod: 26,MA

## 2023-11-28 PROCEDURE — 82330 ASSAY OF CALCIUM: CPT

## 2023-11-28 PROCEDURE — 86850 RBC ANTIBODY SCREEN: CPT

## 2023-11-28 PROCEDURE — 87086 URINE CULTURE/COLONY COUNT: CPT

## 2023-11-28 PROCEDURE — 83605 ASSAY OF LACTIC ACID: CPT

## 2023-11-28 RX ORDER — ACETAMINOPHEN 500 MG
1000 TABLET ORAL ONCE
Refills: 0 | Status: COMPLETED | OUTPATIENT
Start: 2023-11-28 | End: 2023-11-28

## 2023-11-28 RX ORDER — LIDOCAINE 4 G/100G
1 CREAM TOPICAL ONCE
Refills: 0 | Status: COMPLETED | OUTPATIENT
Start: 2023-11-28 | End: 2023-11-28

## 2023-11-28 RX ADMIN — LIDOCAINE 1 PATCH: 4 CREAM TOPICAL at 18:20

## 2023-11-28 RX ADMIN — Medication 400 MILLIGRAM(S): at 17:36

## 2023-11-28 NOTE — ED PROVIDER NOTE - PATIENT PORTAL LINK FT
You can access the FollowMyHealth Patient Portal offered by [facility] by registering at the following website: [email or web address]. By joining [employer]’s FollowMyHealth portal, you will also be able to view your health information using other applications (apps) compatible with our system. You can access the FollowMyHealth Patient Portal offered by [facility] by registering at the following website: [email or web address]. By joining [employer]’s FollowMyHealth portal, you will also be able to view your health information using other applications (apps) compatible with our system. You can access the FollowMyHealth Patient Portal offered by [facility] by registering at the following website: [email or web address]. By joining [employer]’s FollowMyHealth portal, you will also be able to view your health information using other applications (apps) compatible with our system.

## 2023-11-28 NOTE — ED ADULT NURSE NOTE - NSFALLHARMRISKINTERV_ED_ALL_ED
Communicate risk of Fall with Harm to all staff, patient, and family/Provide patient with walking aids/Provide visual cue: red socks, yellow wristband, yellow gown, etc/Reinforce activity limits and safety measures with patient and family/Bed in lowest position, wheels locked, appropriate side rails in place/Call bell, personal items and telephone in reach/Instruct patient to call for assistance before getting out of bed/chair/stretcher/Non-slip footwear applied when patient is off stretcher/[place] to call system/Physically safe environment - no spills, clutter or unnecessary equipment/Purposeful Proactive Rounding/Room/bathroom lighting operational, light cord in reach Communicate risk of Fall with Harm to all staff, patient, and family/Provide patient with walking aids/Provide visual cue: red socks, yellow wristband, yellow gown, etc/Reinforce activity limits and safety measures with patient and family/Bed in lowest position, wheels locked, appropriate side rails in place/Call bell, personal items and telephone in reach/Instruct patient to call for assistance before getting out of bed/chair/stretcher/Non-slip footwear applied when patient is off stretcher/[place] to call system/Physically safe environment - no spills, clutter or unnecessary equipment/Purposeful Proactive Rounding/Room/bathroom lighting operational, light cord in reach Communicate risk of Fall with Harm to all staff, patient, and family/Provide patient with walking aids/Provide visual cue: red socks, yellow wristband, yellow gown, etc/Reinforce activity limits and safety measures with patient and family/Bed in lowest position, wheels locked, appropriate side rails in place/Call bell, personal items and telephone in reach/Instruct patient to call for assistance before getting out of bed/chair/stretcher/Non-slip footwear applied when patient is off stretcher/[place] to call system/Physically safe environment - no spills, clutter or unnecessary equipment/Purposeful Proactive Rounding/Room/bathroom lighting operational, light cord in reach

## 2023-11-28 NOTE — ED PROVIDER NOTE - NSFOLLOWUPINSTRUCTIONS_ED_ALL_ED_FT
YOU ARE LEAVING AGAINST MEDICAL ADVICE    Follow up with your Primary Care Physician TOMORROW   Bring a copy of your test results with you to your appointment  Continue your current medication regimen  Return to the Emergency Room if you experience new or worsening symptoms abdominal pain, nausea, vomiting, fever chills, cough, chest pain, shortness of breath, dizziness, slurred speech, weakness, gait abnormality

## 2023-11-28 NOTE — ED PROVIDER NOTE - NS ED ATTENDING STATEMENT MOD
Normal rate, regular rhythm.  Heart sounds S1, S2.  No murmurs, rubs or gallops. Normal radial pulse.
This was a shared visit with the KEISHA. I reviewed and verified the documentation and independently performed the documented:

## 2023-11-28 NOTE — ED ADULT NURSE NOTE - NS ED NURSE LEVEL OF CONSCIOUSNESS MENTAL STATUS
Assessment  1  Never smoker   · Hasn't smoked since he was 15yrs old   2  History of No advance directives (V49 89) (Z78 9)   3  Chronic kidney disease, stage 3 (585 3) (N18 3)   4  Diabetes mellitus (250 00) (E11 9)   5  Diabetic peripheral neuropathy (250 60,357 2) (E11 42)    Plan  Chronic kidney disease, stage 3    · (1) MICROALBUMIN CREATININE RATIO, RANDOM URINE; Status:Active - Retrospective  By Protocol Authorization; Requested for:01Nov2017;   Chronic kidney disease, stage 3, Controlled type 2 diabetes mellitus with  microalbuminuria, with long-term current use of insulin    · (1) BASIC METABOLIC PROFILE; Status:Active - Retrospective By Protocol Authorization; Requested for:01Nov2017;   Chronic kidney disease, stage 3, Diabetes mellitus    · (1) HEMOGLOBIN A1C; Status:Active - Retrospective By Protocol Authorization; Requested for:01Nov2017;   Diabetic peripheral neuropathy    · Follow-up visit in 3 months Evaluation and Treatment  Follow-up  Status: Hold For -  Scheduling  Requested for: 47SVM7740    Discussion/Summary    Patient will have his diabetic lab work done November 1  We will see him back in about 3 months  Chief Complaint  Patient is here today for follow up of chronic conditions described in HPI  History of Present Illness  The patient is being seen for follow-up of peripheral neuropathy  The patient reports doing well  He has had no significant interval events  Interval symptoms:  stable lower extremity paresthesias-- and-- stable lower extremity pain  Associated symptoms: no hand clumsiness,-- no gait disturbance,-- no recurrent falls,-- no muscle cramps-- and-- no muscle twitching  Medications:  the patient is adherent to his medication regimen, but-- he denies medication side effects  Disease management:  the patient is doing well with his goals  The patient states he has been doing well with his Type II Diabetes control since the last visit   Comorbid Illnesses: hyperlipidemia  He has no known diabetic complications  He has no significant interval events  Symptoms: The patient is currently asymptomatic  Associated symptoms include no polyuria-- and-- no polydipsia  Review of Systems    Constitutional: No fever or chills, feels well, no tiredness, no recent weight gain or weight loss  Eyes: No complaints of eye pain, no red eyes, no discharge from eyes, no itchy eyes  ENT: no complaints of earache, no hearing loss, no nosebleeds, no nasal discharge, no sore throat, no hoarseness  Cardiovascular: No complaints of slow heart rate, no fast heart rate, no chest pain, no palpitations, no leg claudication, no lower extremity  Respiratory: No complaints of shortness of breath, no wheezing, no cough, no SOB on exertion, no orthopnea or PND  Gastrointestinal: No complaints of abdominal pain, no constipation, no nausea or vomiting, no diarrhea or bloody stools  Genitourinary: No complaints of dysuria, no incontinence, no hesitancy, no nocturia, no genital lesion, no testicular pain  Musculoskeletal: No complaints of arthralgia, no myalgias, no joint swelling or stiffness, no limb pain or swelling  Integumentary: No complaints of skin rash or skin lesions, no itching, no skin wound, no dry skin  Neurological: numbness  Psychiatric: Is not suicidal, no sleep disturbances, no anxiety or depression, no change in personality, no emotional problems  Endocrine: No complaints of proptosis, no hot flashes, no muscle weakness, no erectile dysfunction, no deepening of the voice, no feelings of weakness  Hematologic/Lymphatic: No complaints of swollen glands, no swollen glands in the neck, does not bleed easily, no easy bruising  Preventive Quality 65 and Older: Falls Risk: The patient fell 0 times in the past 12 months  The patient is currently asymptomatic Symptoms Include: The patient currently has no urinary incontinence symptoms       Over the past 2 weeks, how often have you been bothered by the following problems? 1 ) Little interest or pleasure in doing things? Not at all    2 ) Feeling down, depressed or hopeless? Not at all    3 ) Trouble falling asleep or sleeping too much? Not at all    4 ) Feeling tired or having little energy? Not at all    5 ) Poor appetite or overeating? Not at all    6 ) Feeling bad about yourself, or that you are a failure, or have let yourself or your family down? Not at all    7 ) Trouble concentrating on things, such as reading a newspaper or watching television? Not at all    8 ) Moving or speaking so slowly that other people could have noticed, or the opposite, moving or speaking faster than usual? Not at all    9 ) Thoughts that you would be better off dead or of hurting yourself in some way? Not at all  Score 0     ROS reviewed  Active Problems  1  Ambulatory dysfunction (719 7) (R26 2)   2  Benign essential hypertension (401 1) (I10)   3  Chronic kidney disease, stage 3 (585 3) (N18 3)   4  Controlled type 2 diabetes mellitus with microalbuminuria, with long-term current use of   insulin (250 40,791 0,V58 67) (E11 29,R80 9,Z79 4)   5  Dementia without behavioral disturbance (294 20) (F03 90)   6  Depression screen (V79 0) (Z13 89)   7  Diabetes mellitus (250 00) (E11 9)   8  Diabetic peripheral neuropathy (250 60,357 2) (E11 42)   9  Exercise counseling (V65 41) (Z71 82)   10  Flu vaccine need (V04 81) (Z23)   11  Nephrolithiasis (592 0) (N20 0)   12  History of No advance directives (V49 89) (Z78 9)   13  Screening for diabetic peripheral neuropathy (V80 09) (Z13 89)   14  Screening for diabetic retinopathy (V80 2) (Z13 5)   15  Screening for genitourinary condition (V81 6) (Z13 89)   16  Screening for neurological condition (V80 09) (Z13 89)   17  Vitamin B12 deficiency (266 2) (E53 8)    Past Medical History  1  History of Encounter for prostate cancer screening (V76 44) (Z12 5)   2   History of influenza vaccination (V49 89) (Z92 29)   3  History of pneumococcal vaccination (V49 89) (Z92 29)    The active problems and past medical history were reviewed and updated today  Surgical History  1  History of Appendectomy   2  History of Thyroid Surgery    The surgical history was reviewed and updated today  Family History  Mother    1  Family history of cataracts (V19 19) (Z15 175)  Father    2  Family history of Jaundice    The family history was reviewed and updated today  Social History   · Denied: History of Alcohol use   · Denied: History of Drug use   · Never smoker   · History of No advance directives (V49 89) (Z78 9)   · No living will  The social history was reviewed and updated today  The social history was reviewed and is unchanged  Current Meds   1  Alcohol Prep Pad; USE AS DIRECTED; Therapy: 66Okw5472 to (Last Rx:13Mbd0646)  Requested for: 48Rnq3913 Ordered   2  Basaglar KwikPen 100 UNIT/ML Subcutaneous Solution Pen-injector; INJECT 30 UNIT   Daily; Therapy: 27Shx4428 to (Last Rx:12Sep2017)  Requested for: 53Hkw8871 Ordered   3  [employer] 2 Test In HumanCloud; Checking 4-5 times per day; Therapy: 40Ovu9529 to (Last Rx:93Biz6505)  Requested for: 43Mut9837 Ordered   4  Laxmi Microlet Lancets Miscellaneous; testing blood 4-5 times per day; Therapy: 74Tka6093 to (Last Rx:31Mmy4151)  Requested for: 48Smt1329 Ordered   5  BD Insulin Syringe Ultrafine 31G X 15/64 0 3 ML Miscellaneous; 4 syringes daily to inject   Humalog and Lantus; Therapy: 57EPV5099 to (Last Rx:95Tlf9795)  Requested for: 97Ryx3419 Ordered   6  BD Pen Needle Short U/F 31G X 8 MM Miscellaneous; USE AS DIRECTED ONE DAILY; Therapy: 79Dpp4392 to (Last [facility])  Requested for: 25Ooq3754 Ordered   7  Donepezil HCl - 5 MG Oral Tablet; take one tablet at bedtime; Therapy: 24Yye6966 to (Evaluate:76Fvt4164)  Requested for: 41Fnn9740; Last   Rx:85Vjv1229 Ordered   8   Gabapentin 100 MG Oral Capsule; TAKE 1 CAPSULE Bedtime; Therapy: 03Ueo0171 to (Complete:79Txz2113)  Requested for: 02Xyd3984; Last   Rx:51Qyq5218 Ordered   9  HumaLOG 100 UNIT/ML Subcutaneous Solution; INJECT 6 UNIT Daily with meals or   sliding scale dependent on sugar results  Requested for: 24Iwf5771; Last   Rx:46Ukd5120 Ordered   10  Lisinopril 5 MG Oral Tablet; Take 1 tablet twice daily; Therapy: 93PHV5506 to ([facility])  Requested for: 53Ube3799; Last    Rx:64Qll6847 Ordered   11  Pravastatin Sodium 10 MG Oral Tablet; Take 1 tablet by mouth at bedtime; Therapy: 23NAC5709 to ([facility])  Requested for: 74War8203; Last    Rx:00Knj3693 Ordered    The medication list was reviewed and updated today  Allergies  1  No Known Drug Allergies    Vitals  Vital Signs    Recorded: 80NAG5146 39:61YV   Systolic 680   Diastolic 74   Height 5 ft 9 in   Weight 203 lb 12 8 oz   BMI Calculated 30 1   BSA Calculated 2 08     Physical Exam    Constitutional   General appearance: No acute distress, well appearing and well nourished  Eyes   Conjunctiva and lids: No swelling, erythema, or discharge  Pupils and irises: Equal, round and reactive to light  Ears, Nose, Mouth, and Throat   External inspection of ears and nose: Normal     Otoscopic examination: Tympanic membrance translucent with normal light reflex  Canals patent without erythema  Nasal mucosa, septum, and turbinates: Normal without edema or erythema  Oropharynx: Normal with no erythema, edema, exudate or lesions  Pulmonary   Respiratory effort: No increased work of breathing or signs of respiratory distress  Auscultation of lungs: Clear to auscultation, equal breath sounds bilaterally, no wheezes, no rales, no rhonci  Cardiovascular   Palpation of heart: Normal PMI, no thrills  Auscultation of heart: Normal rate and rhythm, normal S1 and S2, without murmurs      Examination of extremities for edema and/or varicosities: Normal     Carotid pulses: Normal  Abdomen   Abdomen: Non-tender, no masses  Liver and spleen: No hepatomegaly or splenomegaly  Lymphatic   Palpation of lymph nodes in neck: No lymphadenopathy  Musculoskeletal   Gait and station: Normal     Digits and nails: Normal without clubbing or cyanosis  Inspection/palpation of joints, bones, and muscles: Normal     Skin   Skin and subcutaneous tissue: Normal without rashes or lesions  Neurologic   Cranial nerves: Cranial nerves 2-12 intact  Reflexes: 2+ and symmetric  Sensation: No sensory loss  Psychiatric   Orientation to person, place and time: Normal     Mood and affect: Normal         Socks and shoes removed, Right Foot Findings: normal foot, no swelling, no erythema  The right toes were normal-- and-- had full range of motion  The sensory exam showed normal vibratory sensation at the level of the toes on the right  Normal tactile sensation with monofilament testing throughout the right foot  Socks and shoes removed, Left Foot Findings: normal foot, no swelling, no erythema  The left toes were normal-- and-- had full range of motion  The sensory exam showed normal vibratory sensation at the level of the toes on the left  Normal tactile sensation with monofilament testing throughout the left foot  Capillary refills findings on the right were normal in the toes  Pulses:   2+ in the posterior tibialis on the right   2+ in the dorsalis pedis on the right  Capillary refills findings on the left were normal in the toes  Pulses:   2+ in the posterior tibialis on the left   2+ in the dorsalis pedis on the left  Assign Risk Category: 0: No loss of protective sensation, no deformity  No present risk  Health Management  Health Maintenance   Medicare Annual Wellness Visit; every 1 year; Last 50XHQ7493; Next Due: 19Ygk6278;  Overdue    Future Appointments    Date/Time Provider Specialty Site   10/30/2017 02:30 PM [facility]     Signatures   Electronically signed by : Naren Sánchez DO; Oct 17 2017 12:24PM EST                       (Author) Awake[employer]

## 2023-11-28 NOTE — ED ADULT NURSE NOTE - NSFALLTYPE_ED_ALL_ED
[facility] Fall Oct 23'/Multiple falls [facility] Fall Oct 23'/Multiple falls [facility] Fall Oct 23'/Multiple falls

## 2023-11-28 NOTE — ED PROVIDER NOTE - OBJECTIVE STATEMENT
87 y/o female PMHx atrial fib on eliquis now presenting to the ED with left sided rib pain s/p unwitnessed fall this morning into chair. Patient unsure how the fall happened but has pleuritic pain. Patient has not taken medication for pain as of yet. Patient had episode of urinary incontinence this morning which is not typical for her. Denied dizziness prior to fall, CP, SOB, abdominal pain, N/V/D, syncope, palpitations, weakness,    TTE 10/12/23   1. Left ventricular systolic function is normal with an ejection fraction of 61 % by Daniel's method of disks.   2. Normal right ventricular cavity size, wall thickness, and systolic function.   3. Normal atria.   4. No significant valvular disease.   5. No pericardial effusion seen. 89 y/o female PMHx atrial fib on eliquis now presenting to the ED with left sided rib pain s/p unwitnessed fall this morning into chair. Patient unsure how the fall happened but has pleuritic pain. Patient has not taken medication for pain as of yet. Patient had episode of urinary incontinence this morning which is not typical for her. Denied dizziness prior to fall, CP, SOB, abdominal pain, N/V/D, syncope, palpitations, weakness,    TTE 10/12/23   1. Left ventricular systolic function is normal with an ejection fraction of 61 % by Daniel's method of disks.   2. Normal right ventricular cavity size, wall thickness, and systolic function.   3. Normal atria.   4. No significant valvular disease.   5. No pericardial effusion seen.

## 2023-11-28 NOTE — ED PROVIDER NOTE - PROGRESS NOTE DETAILS
JOHN Cazares: recommended medical admission but pt declined    The patient wishes to be discharged against medical advice. I have assessed the patient's mental status and  the patient has capacity to make this decision. I have explained the risks of leaving without full treatment, including severe disability and death, which the patient understands and is willing to accept. I have answered all of the patient's questions. I reiterated my medical opinion and advised the patient to return at any time. We discussed the further workup outside of the current visit and return precautions. Attending MD Woodruff: Patient now reporting to this MD that she remembers entire event and did not have LOC but cannot say how she fell.  Explained recommendation for admission.  The patient wants to leave the hospital against medical advice.  The patient understands the risks, benefits and alternatives of this decision.  The risks of repeated fall, possible syncope, cardiac arrhythmia, permanent disability, and death explained to patient and patient's granddaughter at bedside.  Patient demonstrated understanding of these risks.  The patient was instructed to follow-up immediately with their primary physician or return to Emergency Department for repeat episode.  Follow up instructions given, importance of follow up emphasized, return to ED parameters reviewed and patient verbalized understanding.  All questions answered, all concerns addressed.

## 2023-11-28 NOTE — ED ADULT NURSE NOTE - OBJECTIVE STATEMENT
PAIN
The patient is a 88y female presenting to the ED from home for complaints of a fall. Patient reports a PMH of Afib (On Eliquis),  and HLD. She states earlier today around 0615 the patient had a fall out of no where. Patient does not recall LOC before fall or feeling weak before the fall. Upon assessment the patient is breathing spontaneously and unlabored on room air. She is alert and orientated x4 and responds appropriately. The patient is ambulatory up @ ezequiel with standby assist. Pt denies chest pain, palpitations, shortness of breath, headache, visual disturbances, numbness/tingling, fever, chills, diaphoresis,  nausea, vomiting, constipation, diarrhea, or urinary symptoms @ this time. Safety and comfort measures provided, bed locked and in lowest position, side rails up for safety. Call bell within reach. Cardiac monitor in place and patient transported to X-ray.

## 2023-11-28 NOTE — ED PROVIDER NOTE - ATTENDING APP SHARED VISIT CONTRIBUTION OF CARE
Attending MD Woodruff:   I personally have seen and examined this patient.  Physician assistant note reviewed and agree on plan of care and except where noted.  See below for details.     Seen in [place], accompanied by grandson    88F with PMH/PSH including pAFib on Eliquis, HTN, hypothyroidism presents to the ED with fall.  Reports this morning was getting out of bed and walking to restroom when she fell suddenly.  Reports does not know how she fell or what she hit but indicates L lower rib and LUQ pain.   Denies preceding dizziness, weakness, sensory changes, chest pain, shortness of breath.  Denies prodromal symptoms, reports does not remember entire event. Denies shortness of breath, palpitations. Denies abdominal pain, nausea, vomiting, diarrhea, bloody or black stools. Denies fevers, chills.  Denies change in vision, double vision, sudden loss of vision. Reports had  loss of urinary continence, reports this is not typical for her.  Denies loss of stool continence.  Denies dysuria, hematuria, frequency.  Reports had previous fall in October and reports that she did not have prodromal symptoms for that event.      Trauma Assessment:  A - airway patent, speaking clearly  B - symmetrical chest rise, no increased work of breathing, breath sounds bilaterally  C - no active bleeding, skin warm and dry  D - GCS 15, PERRL  E - exposed     Exam:   General: NAD  HENT: head NCAT, airway patent  Eyes: anicteric, no conjunctival injection   Lungs: lungs CTAB with good inspiratory effort, no wheezing, no rhonchi, no rales  Chest: +S1S2, no obvious m/r/g, +tenderness to palpation of L anterior lower ribs, no ecchymosis  GI: abdomen soft with +BS, +LUQ tenderness, ND  : no CVAT  MSK: ranging neck and extremities freely  Neuro: moving all extremities spontaneously, nonfocal  Psych: normal mood and affect     A/P: 88F with L anterior lower rib pain and LUQ pain after fall with reported incontinence, will evaluate for but not limited to metabolic derangement, will obtain trauma CTH to eval for ICH, will obtain CT C spine to eval for bony injury, will obtain CT CAP to eval for rib fractures, splenic injury or any other traumatic injury, will use UA and CT Chest to eval for occult fractures such as UTI and PNA.

## 2023-11-30 LAB
CULTURE RESULTS: SIGNIFICANT CHANGE UP
SPECIMEN SOURCE: SIGNIFICANT CHANGE UP

## 2023-12-31 PROBLEM — E03.9 HYPOTHYROIDISM, UNSPECIFIED: Chronic | Status: ACTIVE | Noted: 2021-09-04

## 2023-12-31 PROBLEM — I10 ESSENTIAL (PRIMARY) HYPERTENSION: Chronic | Status: ACTIVE | Noted: 2021-09-04

## 2023-12-31 PROBLEM — I48.91 UNSPECIFIED ATRIAL FIBRILLATION: Chronic | Status: ACTIVE | Noted: 2021-09-04

## 2023-12-31 RX ORDER — LEVOTHYROXINE SODIUM 125 MCG
1 TABLET ORAL
Refills: 0 | DISCHARGE

## 2023-12-31 RX ORDER — HYDROCHLOROTHIAZIDE 25 MG
1 TABLET ORAL
Refills: 0 | DISCHARGE

## 2023-12-31 RX ORDER — APIXABAN 2.5 MG/1
1 TABLET, FILM COATED ORAL
Refills: 0 | DISCHARGE

## 2023-12-31 RX ORDER — METOPROLOL TARTRATE 50 MG
1 TABLET ORAL
Refills: 0 | DISCHARGE

## 2023-12-31 RX ORDER — ATORVASTATIN CALCIUM 80 MG/1
1 TABLET, FILM COATED ORAL
Refills: 0 | DISCHARGE

## 2023-12-31 RX ORDER — LOSARTAN POTASSIUM 100 MG/1
1 TABLET, FILM COATED ORAL
Refills: 0 | DISCHARGE

## 2023-12-31 RX ORDER — LATANOPROST 0.05 MG/ML
1 SOLUTION/ DROPS OPHTHALMIC; TOPICAL
Refills: 0 | DISCHARGE

## 2024-03-07 PROBLEM — I48.0 PAROXYSMAL ATRIAL FIBRILLATION: Chronic | Status: ACTIVE | Noted: 2023-10-11

## 2024-03-07 PROBLEM — H40.9 UNSPECIFIED GLAUCOMA: Chronic | Status: ACTIVE | Noted: 2023-10-11

## 2024-03-07 PROBLEM — E03.9 HYPOTHYROIDISM, UNSPECIFIED: Chronic | Status: ACTIVE | Noted: 2023-10-11

## 2024-03-07 PROBLEM — I10 ESSENTIAL (PRIMARY) HYPERTENSION: Chronic | Status: ACTIVE | Noted: 2023-10-11

## 2024-08-20 ENCOUNTER — APPOINTMENT (OUTPATIENT)
Dept: NEUROLOGY | Facility: CLINIC | Age: 89
End: 2024-08-20

## 2024-12-19 ENCOUNTER — APPOINTMENT (OUTPATIENT)
Dept: NEUROLOGY | Facility: CLINIC | Age: 89
End: 2024-12-19

## 2025-03-19 ENCOUNTER — APPOINTMENT (OUTPATIENT)
Dept: NEUROLOGY | Facility: CLINIC | Age: 89
End: 2025-03-19

## 2025-03-27 ENCOUNTER — RESULT REVIEW (OUTPATIENT)
Age: 89
End: 2025-03-27

## 2025-03-27 ENCOUNTER — APPOINTMENT (OUTPATIENT)
Dept: CV DIAGNOSITCS | Facility: HOSPITAL | Age: 89
End: 2025-03-27

## 2025-03-27 ENCOUNTER — OUTPATIENT (OUTPATIENT)
Dept: OUTPATIENT SERVICES | Facility: HOSPITAL | Age: 89
LOS: 1 days | End: 2025-03-27
Payer: MEDICARE

## 2025-03-27 VITALS
SYSTOLIC BLOOD PRESSURE: 116 MMHG | OXYGEN SATURATION: 97 % | HEART RATE: 129 BPM | DIASTOLIC BLOOD PRESSURE: 84 MMHG | RESPIRATION RATE: 16 BRPM

## 2025-03-27 VITALS
OXYGEN SATURATION: 96 % | RESPIRATION RATE: 16 BRPM | HEART RATE: 96 BPM | SYSTOLIC BLOOD PRESSURE: 142 MMHG | DIASTOLIC BLOOD PRESSURE: 75 MMHG

## 2025-03-27 DIAGNOSIS — Z96.659 PRESENCE OF UNSPECIFIED ARTIFICIAL KNEE JOINT: Chronic | ICD-10-CM

## 2025-03-27 PROCEDURE — 93312 ECHO TRANSESOPHAGEAL: CPT | Mod: 26

## 2025-03-27 PROCEDURE — 93320 DOPPLER ECHO COMPLETE: CPT

## 2025-03-27 PROCEDURE — 93312 ECHO TRANSESOPHAGEAL: CPT

## 2025-03-27 PROCEDURE — 93325 DOPPLER ECHO COLOR FLOW MAPG: CPT

## 2025-03-27 PROCEDURE — 93320 DOPPLER ECHO COMPLETE: CPT | Mod: 26

## 2025-03-27 PROCEDURE — 93325 DOPPLER ECHO COLOR FLOW MAPG: CPT | Mod: 26

## 2025-03-27 NOTE — PRE-ANESTHESIA EVALUATION ADULT - NSANTHPEFT_GEN_ALL_CORE
General: well appearing, appears stated age, NAD  Cardiovascular: irregular rate and rhythm  Respiratory: sating well on RA

## 2025-03-28 ENCOUNTER — TRANSCRIPTION ENCOUNTER (OUTPATIENT)
Age: 89
End: 2025-03-28

## 2025-03-28 ENCOUNTER — OUTPATIENT (OUTPATIENT)
Dept: OUTPATIENT SERVICES | Facility: HOSPITAL | Age: 89
LOS: 1 days | End: 2025-03-28
Payer: MEDICARE

## 2025-03-28 VITALS
HEART RATE: 122 BPM | WEIGHT: 128.09 LBS | TEMPERATURE: 98 F | HEIGHT: 66 IN | SYSTOLIC BLOOD PRESSURE: 130 MMHG | OXYGEN SATURATION: 98 % | RESPIRATION RATE: 16 BRPM | DIASTOLIC BLOOD PRESSURE: 86 MMHG

## 2025-03-28 VITALS
DIASTOLIC BLOOD PRESSURE: 67 MMHG | OXYGEN SATURATION: 95 % | HEART RATE: 85 BPM | SYSTOLIC BLOOD PRESSURE: 118 MMHG | RESPIRATION RATE: 18 BRPM | TEMPERATURE: 98 F

## 2025-03-28 DIAGNOSIS — I48.0 PAROXYSMAL ATRIAL FIBRILLATION: ICD-10-CM

## 2025-03-28 DIAGNOSIS — Z96.659 PRESENCE OF UNSPECIFIED ARTIFICIAL KNEE JOINT: Chronic | ICD-10-CM

## 2025-03-28 DIAGNOSIS — Z90.710 ACQUIRED ABSENCE OF BOTH CERVIX AND UTERUS: Chronic | ICD-10-CM

## 2025-03-28 LAB
ANION GAP SERPL CALC-SCNC: 14 MMOL/L — SIGNIFICANT CHANGE UP (ref 5–17)
BUN SERPL-MCNC: 14 MG/DL — SIGNIFICANT CHANGE UP (ref 7–23)
CALCIUM SERPL-MCNC: 9.4 MG/DL — SIGNIFICANT CHANGE UP (ref 8.4–10.5)
CHLORIDE SERPL-SCNC: 105 MMOL/L — SIGNIFICANT CHANGE UP (ref 96–108)
CO2 SERPL-SCNC: 22 MMOL/L — SIGNIFICANT CHANGE UP (ref 22–31)
CREAT SERPL-MCNC: 0.94 MG/DL — SIGNIFICANT CHANGE UP (ref 0.5–1.3)
EGFR: 59 ML/MIN/1.73M2 — LOW
EGFR: 59 ML/MIN/1.73M2 — LOW
GLUCOSE SERPL-MCNC: 151 MG/DL — HIGH (ref 70–99)
HCT VFR BLD CALC: 40.7 % — SIGNIFICANT CHANGE UP (ref 34.5–45)
HGB BLD-MCNC: 13.7 G/DL — SIGNIFICANT CHANGE UP (ref 11.5–15.5)
MCHC RBC-ENTMCNC: 32.4 PG — SIGNIFICANT CHANGE UP (ref 27–34)
MCHC RBC-ENTMCNC: 33.7 G/DL — SIGNIFICANT CHANGE UP (ref 32–36)
MCV RBC AUTO: 96.2 FL — SIGNIFICANT CHANGE UP (ref 80–100)
NRBC BLD AUTO-RTO: 0 /100 WBCS — SIGNIFICANT CHANGE UP (ref 0–0)
PLATELET # BLD AUTO: 224 K/UL — SIGNIFICANT CHANGE UP (ref 150–400)
POTASSIUM SERPL-MCNC: 3.7 MMOL/L — SIGNIFICANT CHANGE UP (ref 3.5–5.3)
POTASSIUM SERPL-SCNC: 3.7 MMOL/L — SIGNIFICANT CHANGE UP (ref 3.5–5.3)
RBC # BLD: 4.23 M/UL — SIGNIFICANT CHANGE UP (ref 3.8–5.2)
RBC # FLD: 12.3 % — SIGNIFICANT CHANGE UP (ref 10.3–14.5)
SODIUM SERPL-SCNC: 141 MMOL/L — SIGNIFICANT CHANGE UP (ref 135–145)
WBC # BLD: 4.7 K/UL — SIGNIFICANT CHANGE UP (ref 3.8–10.5)
WBC # FLD AUTO: 4.7 K/UL — SIGNIFICANT CHANGE UP (ref 3.8–10.5)

## 2025-03-28 PROCEDURE — 86901 BLOOD TYPING SEROLOGIC RH(D): CPT

## 2025-03-28 PROCEDURE — C1733: CPT

## 2025-03-28 PROCEDURE — 93005 ELECTROCARDIOGRAM TRACING: CPT

## 2025-03-28 PROCEDURE — 93656 COMPRE EP EVAL ABLTJ ATR FIB: CPT

## 2025-03-28 PROCEDURE — 86900 BLOOD TYPING SEROLOGIC ABO: CPT

## 2025-03-28 PROCEDURE — 80048 BASIC METABOLIC PNL TOTAL CA: CPT

## 2025-03-28 PROCEDURE — C1894: CPT

## 2025-03-28 PROCEDURE — C1759: CPT

## 2025-03-28 PROCEDURE — 85027 COMPLETE CBC AUTOMATED: CPT

## 2025-03-28 PROCEDURE — C9399: CPT

## 2025-03-28 PROCEDURE — 93010 ELECTROCARDIOGRAM REPORT: CPT

## 2025-03-28 PROCEDURE — C1766: CPT

## 2025-03-28 PROCEDURE — C1730: CPT

## 2025-03-28 PROCEDURE — 93010 ELECTROCARDIOGRAM REPORT: CPT | Mod: 77

## 2025-03-28 PROCEDURE — 82962 GLUCOSE BLOOD TEST: CPT

## 2025-03-28 PROCEDURE — 86850 RBC ANTIBODY SCREEN: CPT

## 2025-03-28 PROCEDURE — 93655 ICAR CATH ABLTJ DSCRT ARRHYT: CPT

## 2025-03-28 PROCEDURE — C1760: CPT

## 2025-03-28 PROCEDURE — C1769: CPT

## 2025-03-28 RX ORDER — LEVOTHYROXINE SODIUM 300 MCG
50 TABLET ORAL DAILY
Refills: 0 | Status: ACTIVE | OUTPATIENT
Start: 2025-03-28 | End: 2026-02-24

## 2025-03-28 RX ORDER — CARVEDILOL 3.12 MG/1
1 TABLET, FILM COATED ORAL
Refills: 0 | DISCHARGE

## 2025-03-28 RX ORDER — DILTIAZEM HYDROCHLORIDE 240 MG/1
1 TABLET, EXTENDED RELEASE ORAL
Refills: 0 | DISCHARGE

## 2025-03-28 RX ORDER — ATORVASTATIN CALCIUM 80 MG/1
1 TABLET, FILM COATED ORAL
Refills: 0 | DISCHARGE

## 2025-03-28 RX ORDER — FENTANYL CITRATE-0.9 % NACL/PF 100MCG/2ML
25 SYRINGE (ML) INTRAVENOUS
Refills: 0 | Status: DISCONTINUED | OUTPATIENT
Start: 2025-03-28 | End: 2025-03-28

## 2025-03-28 RX ORDER — ONDANSETRON HCL/PF 4 MG/2 ML
4 VIAL (ML) INJECTION ONCE
Refills: 0 | Status: ACTIVE | OUTPATIENT
Start: 2025-03-28 | End: 2026-02-24

## 2025-03-28 RX ORDER — CARVEDILOL 3.12 MG/1
25 TABLET, FILM COATED ORAL EVERY 12 HOURS
Refills: 0 | Status: ACTIVE | OUTPATIENT
Start: 2025-03-28 | End: 2026-02-24

## 2025-03-28 RX ORDER — ATORVASTATIN CALCIUM 80 MG/1
40 TABLET, FILM COATED ORAL AT BEDTIME
Refills: 0 | Status: ACTIVE | OUTPATIENT
Start: 2025-03-28 | End: 2026-02-24

## 2025-03-28 RX ORDER — LATANOPROST PF 0.05 MG/ML
1 SOLUTION/ DROPS OPHTHALMIC AT BEDTIME
Refills: 0 | Status: ACTIVE | OUTPATIENT
Start: 2025-03-28 | End: 2026-02-24

## 2025-03-28 RX ORDER — SODIUM CHLORIDE 9 G/1000ML
1000 INJECTION, SOLUTION INTRAVENOUS
Refills: 0 | Status: DISCONTINUED | OUTPATIENT
Start: 2025-03-28 | End: 2025-03-28

## 2025-03-28 RX ORDER — APIXABAN 2.5 MG/1
2.5 TABLET, FILM COATED ORAL EVERY 12 HOURS
Refills: 0 | Status: ACTIVE | OUTPATIENT
Start: 2025-03-28 | End: 2026-02-24

## 2025-03-28 NOTE — H&P CARDIOLOGY - HISTORY OF PRESENT ILLNESS
89 yr old female with PMHx of HTN, HLD, PAfib presents for Afib Ablation. Pt reports palpitations and pre-syncope.  89 yr old female with PMHx of HTN, HLD, PAfib presents for Afib Ablation. Pt reports palpitations and pre-syncope. Pt uses cane for Ambulation.

## 2025-03-28 NOTE — ASU DISCHARGE PLAN (ADULT/PEDIATRIC) - "IF YOU OR YOUR GUARDIAN/FAMILY IS A SMOKER, IT IS IMPORTANT FOR YOUR HEALTH TO STOP SMOKING. PLEASE BE AWARE THAT SECOND HAND SMOKE IS ALSO HARMFUL."
Thank you for choosing [facility]! We appreciate you coming to us for your medical care. We hope you feel better soon! Please come back to [facility] for all of your future medical needs.      Sincerely,    José Rothman MD  Medical Director  Emergency Department      Statement Selected

## 2025-03-28 NOTE — CONSULT NOTE ADULT - SUBJECTIVE AND OBJECTIVE BOX
C A R D I O L O G Y  *********************    DATE OF SERVICE: 03-28-25    HISTORY OF PRESENT ILLNESS:   89 yr old female with PMHx of HTN, HLD, PAfib mildly reduced LV fx admitted for ablation of symptomatic afib.  She denies CP, but admitts to palpitations and presyncope.  She is s/p ablation and progressing well     PAST MEDICAL & SURGICAL HISTORY:  HTN (hypertension)  Atrial fibrillation  Hypothyroidism  PAF (paroxysmal atrial fibrillation)  Essential hypertension  Glaucoma  Hypothyroidism  S/P knee replacement  L side  H/O: hysterectomy        MEDICATIONS:  MEDICATIONS  (STANDING):  apixaban 2.5 milliGRAM(s) Oral every 12 hours  atorvastatin 40 milliGRAM(s) Oral at bedtime  carvedilol 25 milliGRAM(s) Oral every 12 hours  latanoprost 0.005% Ophthalmic Solution 1 Drop(s) Both EYES at bedtime  levothyroxine 50 MICROGram(s) Oral daily      Allergies    No Known Allergies    Intolerances        FAMILY HISTORY:  No pertinent family history in first degree relatives      Non-contributary for premature coronary disease or sudden cardiac death    SOCIAL HISTORY:    [X ] Non-smoker  [ ] Smoker  [ ] Alcohol        REVIEW OF SYSTEMS:  [ ]chest pain  [  ]shortness of breath  [  ]palpitations  [  ]syncope  [ ]near syncope [ ]upper extremity weakness   [ ] lower extremity weakness  [  ]diplopia  [  ]altered mental status   [  ]fevers  [ ]chills [ ]nausea  [ ]vomitting  [  ]dysphagia    [ ]abdominal pain  [ ]melena  [ ]BRBPR    [  ]epistaxis  [  ]rash    [ ]lower extremity edema        [X] All others negative	  [ ] Unable to obtain      LABS:	 	    CARDIAC MARKERS:                              13.7   4.70  )-----------( 224      ( 28 Mar 2025 08:13 )             40.7     Hb Trend: 13.7<--    03-28    141  |  105  |  14  ----------------------------<  151[H]  3.7   |  22  |  0.94    Ca    9.4      28 Mar 2025 08:13      Creatinine Trend: 0.94<--        PHYSICAL EXAM:  T(C): 36.6 (03-28-25 @ 16:44), Max: 36.7 (03-28-25 @ 12:50)  HR: 85 (03-28-25 @ 16:44) (60 - 122)  BP: 118/67 (03-28-25 @ 16:44) (100/60 - 130/86)  RR: 18 (03-28-25 @ 16:44) (16 - 18)  SpO2: 95% (03-28-25 @ 16:44) (95% - 100%)  Wt(kg): --   BMI (kg/m2): 20.7 (03-28-25 @ 08:57)  I&O's Summary    28 Mar 2025 07:01  -  28 Mar 2025 16:54  --------------------------------------------------------  IN: 0 mL / OUT: 500 mL / NET: -500 mL      HEENT:  (-)icterus (-)pallor  CV: N S1 S2 1/6 BASILIO (+)2 Pulses B/l  Resp:  Clear to ausculatation B/L, normal effort  GI: (+) BS Soft, NT, ND  Lymph:  (-)Edema, (-)obvious lymphadenopathy  Skin: Warm to touch, Normal turgor  Psych: Appropriate mood and affect    Tele: Sinus           ASSESSMENT/PLAN: 	89y Female PMHx of HTN, HLD, PAfib mildly reduced LV fx admitted for ablation of symptomatic afib.     # Cardiomyopathy  - no clinical CHF  - cont coreg    #PAF  - s/p ablation   - AC per EP  - D/C planning when cleared by EP    I once again thank you for allowing me to participate in the care of your patient.  If you have any questions or concerns please do not hesitate to contact me.    Mitch Garrett MD, [facility]  BEEPER [phone]

## 2025-03-28 NOTE — PROGRESS NOTE ADULT - SUBJECTIVE AND OBJECTIVE BOX
EP Brief Operative Note    Diagnosis: AFL/PAF  Procedure: Afib ablation  Surgeon: Patty Wheeler M.D.  Findings: none  EBL: minimal  Specimens: none  Post-op Diagnosis: same  Assistants: none      A/P) s/p atrial fibrillation ablation (PVI and CTI using Affera), no acute complications    -d/c diltiazem, but continue all other home meds including eliquis  -expect d/c home later today or tomorrow pending patient recovery  -get ekg  -f/u with me on 3/31 at 140pm      Patty Wheeler M.D., [facility]  Cardiac Electrophysiology  [facility]  [address] [place] [address], [place] [address]  [email or web address]    office [phone]  pager [phone]   EP Brief Operative Note    Diagnosis: AFL/PAF  Procedure: Afib ablation  Surgeon: Patty Wheeler M.D.  Findings: none  EBL: minimal  Specimens: none  Post-op Diagnosis: same  Assistants: none      A/P) s/p atrial fibrillation ablation (PVI and CTI using Affera), no acute complications    -d/c diltiazem, but continue all other home meds including eliquis  -expect d/c home later today or tomorrow pending patient recovery  -get ekg  -cardiology consult Tami  -medicine consult Refoua  -f/u with me on 3/31 at 140pm      Patty Wheeler M.D., [facility]  Cardiac Electrophysiology  [facility]  [address] [place] [address], [place] [address]  [email or web address]    office [phone]  pager [phone]

## 2025-03-28 NOTE — H&P CARDIOLOGY - NSICDXPASTMEDICALHX_GEN_ALL_CORE_FT
PAST MEDICAL HISTORY:  Essential hypertension     Glaucoma     Hypothyroidism     PAF (paroxysmal atrial fibrillation)      PAST MEDICAL HISTORY:  Atrial fibrillation     Essential hypertension     Glaucoma     HTN (hypertension)     Hypothyroidism     Hypothyroidism     PAF (paroxysmal atrial fibrillation)

## 2025-03-28 NOTE — PATIENT PROFILE ADULT - FALL HARM RISK - HARM RISK INTERVENTIONS

## 2025-03-28 NOTE — H&P CARDIOLOGY - NSICDXPASTSURGICALHX_GEN_ALL_CORE_FT
PAST SURGICAL HISTORY:  No significant past surgical history PAST SURGICAL HISTORY:  H/O: hysterectomy     S/P knee replacement L side

## 2025-03-28 NOTE — PATIENT PROFILE ADULT - FUNCTIONAL ASSESSMENT - BASIC MOBILITY 6.
3-calculated by average/Not able to assess (calculate score using [facility] averaging method)

## 2025-03-28 NOTE — ASU DISCHARGE PLAN (ADULT/PEDIATRIC) - FINANCIAL ASSISTANCE
[facility] provides services at a reduced cost to those who are determined to be eligible through [facility]’s financial assistance program. Information regarding [facility]’s financial assistance program can be found by going to [email or web address] or by calling [phone].

## 2025-03-28 NOTE — ASU DISCHARGE PLAN (ADULT/PEDIATRIC) - CARE PROVIDER_API CALL
Patty Wheeler  Cardiovascular Disease  [address], [address]  [place] [address]  Phone: [phone]  Fax: [phone]  Follow Up Time:

## 2025-07-21 ENCOUNTER — NON-APPOINTMENT (OUTPATIENT)
Age: 89
End: 2025-07-21

## 2025-07-22 ENCOUNTER — NON-APPOINTMENT (OUTPATIENT)
Age: 89
End: 2025-07-22

## 2025-07-22 ENCOUNTER — APPOINTMENT (OUTPATIENT)
Dept: NEUROLOGY | Facility: CLINIC | Age: 89
End: 2025-07-22
Payer: MEDICARE

## 2025-07-22 VITALS
BODY MASS INDEX: 21.26 KG/M2 | WEIGHT: 120 LBS | DIASTOLIC BLOOD PRESSURE: 92 MMHG | HEART RATE: 78 BPM | OXYGEN SATURATION: 100 % | HEIGHT: 63 IN | SYSTOLIC BLOOD PRESSURE: 146 MMHG | TEMPERATURE: 97.3 F

## 2025-07-22 DIAGNOSIS — Z86.79 PERSONAL HISTORY OF OTHER DISEASES OF THE CIRCULATORY SYSTEM: ICD-10-CM

## 2025-07-22 DIAGNOSIS — R41.3 OTHER AMNESIA: ICD-10-CM

## 2025-07-22 DIAGNOSIS — I48.0 PAROXYSMAL ATRIAL FIBRILLATION: ICD-10-CM

## 2025-07-22 DIAGNOSIS — Z86.39 PERSONAL HISTORY OF OTHER ENDOCRINE, NUTRITIONAL AND METABOLIC DISEASE: ICD-10-CM

## 2025-07-22 DIAGNOSIS — Z86.59 PERSONAL HISTORY OF OTHER MENTAL AND BEHAVIORAL DISORDERS: ICD-10-CM

## 2025-07-22 DIAGNOSIS — Z78.9 OTHER SPECIFIED HEALTH STATUS: ICD-10-CM

## 2025-07-22 DIAGNOSIS — F03.90 UNSPECIFIED DEMENTIA W/OUT BEHAVIORAL DISTURBANCE: ICD-10-CM

## 2025-07-22 PROCEDURE — 99205 OFFICE O/P NEW HI 60 MIN: CPT

## 2025-07-22 RX ORDER — LEVOTHYROXINE SODIUM 0.05 MG/1
50 TABLET ORAL
Refills: 0 | Status: ACTIVE | COMMUNITY

## 2025-07-22 RX ORDER — DEXTROMETHORPHAN POLISTIREX 30 MG/5 ML
500-1000-40 SUSPENSION, EXTENDED RELEASE 12 HR ORAL
Refills: 0 | Status: ACTIVE | COMMUNITY

## 2025-07-22 RX ORDER — ATORVASTATIN CALCIUM 40 MG/1
40 TABLET, FILM COATED ORAL
Refills: 0 | Status: ACTIVE | COMMUNITY

## 2025-07-22 RX ORDER — APIXABAN 2.5 MG/1
2.5 TABLET, FILM COATED ORAL
Refills: 0 | Status: ACTIVE | COMMUNITY

## 2025-07-22 RX ORDER — CARVEDILOL 25 MG/1
25 TABLET, FILM COATED ORAL
Refills: 0 | Status: ACTIVE | COMMUNITY

## 2025-08-05 ENCOUNTER — APPOINTMENT (OUTPATIENT)
Dept: MRI IMAGING | Facility: CLINIC | Age: 89
End: 2025-08-05

## 2025-08-29 ENCOUNTER — APPOINTMENT (OUTPATIENT)
Dept: NEUROLOGY | Facility: CLINIC | Age: 89
End: 2025-08-29